# Patient Record
Sex: MALE | Race: WHITE | Employment: FULL TIME | URBAN - METROPOLITAN AREA
[De-identification: names, ages, dates, MRNs, and addresses within clinical notes are randomized per-mention and may not be internally consistent; named-entity substitution may affect disease eponyms.]

---

## 2021-03-02 ENCOUNTER — HOSPITAL ENCOUNTER (OUTPATIENT)
Dept: ULTRASOUND IMAGING | Age: 56
Discharge: HOME | End: 2021-03-02
Attending: FAMILY MEDICINE
Payer: COMMERCIAL

## 2021-03-02 DIAGNOSIS — R13.10 DYSPHAGIA, UNSPECIFIED TYPE: ICD-10-CM

## 2021-03-02 PROCEDURE — 76536 US EXAM OF HEAD AND NECK: CPT

## 2022-04-11 PROBLEM — Z85.46 HISTORY OF PROSTATE CANCER: Status: ACTIVE | Noted: 2022-04-11

## 2022-06-29 ENCOUNTER — OFFICE VISIT (OUTPATIENT)
Dept: ORTHOPEDIC SURGERY | Age: 57
End: 2022-06-29
Payer: COMMERCIAL

## 2022-06-29 DIAGNOSIS — M77.11 RIGHT LATERAL EPICONDYLITIS: Primary | ICD-10-CM

## 2022-06-29 PROCEDURE — 20551 NJX 1 TENDON ORIGIN/INSJ: CPT | Performed by: ORTHOPAEDIC SURGERY

## 2022-06-29 RX ORDER — MELOXICAM 15 MG/1
15 TABLET ORAL DAILY
Qty: 30 TABLET | Refills: 0 | Status: SHIPPED | OUTPATIENT
Start: 2022-06-29 | End: 2022-07-29

## 2022-06-29 RX ORDER — BETAMETHASONE SODIUM PHOSPHATE AND BETAMETHASONE ACETATE 3; 3 MG/ML; MG/ML
12 INJECTION, SUSPENSION INTRA-ARTICULAR; INTRALESIONAL; INTRAMUSCULAR; SOFT TISSUE ONCE
Status: SHIPPED | OUTPATIENT
Start: 2022-06-29

## 2022-06-29 NOTE — PROGRESS NOTES
Orthopaedic Hand Surgery Note    Name: Pacheco Moyer  YOB: 1965  Gender: male  MRN: [id number]    Follow up: Elbow Pain    HPI: Patient is a 59 y.o. male who return for evaluation of right lateral epicondylitis. On the last visit we performed a steroid injection and prescribed anti-inflammatories, patient reports injection provided nearly 100% relief of symptoms for about 6 weeks and then symptoms started to come back, at this point his symptoms have been going on for over 6 months. ROS/Meds/PSH/PMH/FH/SH: I personally reviewed the patients standard intake form. Pertinents are discussed in the HPI    Physical Examination:  General: Awake and alert. HEENT: Normocephalic, atraumatic  CV/Pulm: Breathing even and unlabored  Circulation: Normal without obvious arterial or venous deficiency. Pulses palpable bilateral upper extremities. Skin: No obvious rashes noted. Lymphatic: No obvious evidence of lymphedema or lymphadenopathy    Musculoskeletal:   Examination on the right upper extremity demonstrates normal sensation to light touch in the median, ulnar and radial distribution, cap refill < 5 seconds in all fingers. There is severe tenderness on the lateral epicondyle, no tenderness on the medial epicondyle or the olecranon, pain on the lateral elbow is aggravated by chairlift test and resisted wrist extension, there is no pain with palpation of the radial tunnel, negative Tinel along the radial nerve tract, no pain with resisted supination, no pain with resisted pronation. Imaging / Electrodiagnostic Tests:     none    Assessment:   1. Right lateral epicondylitis        Plan:  We discussed the diagnosis and different treatment options. We discussed observation, bracing, cortisone injections, therapy and lateral epicondyle debridement with tendon reattachment surgery.   We discussed that lateral epicondylitis is a chronic condition that has been progressing for much longer than the symptoms were evident, this is caused by degeneration of the tendon due to poor vascular supply that occurred over a long period of time, the patient understands that this condition will likely persist for a long time without medical treatment but that a large percentage of patients report improvement of symptoms with conservative measures including injections, braces, therapy and antiinflammatories. However, a small percentage of patients require surgical intervention at some point despite some short-term benefits of conservative treatment. After discussing the risks, benefits and alternatives of all treatment options in detail, the patient elects for right lateral epicondyle steroid injection, Mobic 15 daily for 6 weeks, home exercise program, I will reassess in 2 months, at that time if symptoms or not improving we will discuss surgical options again. Procedure Note    The risk, benefits and alternatives of injection and no injection therapy were discussed. The patient consented for an injection. The patient has been identified by name and birthdate. The injection site was identified, marked and prepped with alcohol swabs. Time out completed. The Right lateral epicondyle was injected with a 22 gauge needle with 2ml of 6mg/ml Betamethasone and 2ml Lidocaine plain 1%. After the area was numb, the 22 gauge needle was used to break down scar tissue and to create a bleeding bony bed to aid in tendon healing. The injection site was then dressed with a bandaid. The patient tolerated the injection well. The patient was instructed to monitor their blood sugars if diabetic and call if any concerns. The patient was instructed to call the office if any adverse local effects occurred or any if any questions or concerns arise. .     Patient voiced accordance and understanding of the information provided and the formulated plan. All questions were answered to the patient's satisfaction during the encounter.     [employer] Shalonda Kumar MD  Orthopaedic Surgery  06/29/22  4:16 PM

## 2022-08-31 ENCOUNTER — OFFICE VISIT (OUTPATIENT)
Dept: ORTHOPEDIC SURGERY | Age: 57
End: 2022-08-31
Payer: COMMERCIAL

## 2022-08-31 DIAGNOSIS — M77.11 LATERAL EPICONDYLITIS, RIGHT ELBOW: Primary | ICD-10-CM

## 2022-08-31 PROCEDURE — 99214 OFFICE O/P EST MOD 30 MIN: CPT | Performed by: ORTHOPAEDIC SURGERY

## 2022-08-31 RX ORDER — MELOXICAM 15 MG/1
15 TABLET ORAL DAILY
Qty: 42 TABLET | Refills: 0 | Status: SHIPPED | OUTPATIENT
Start: 2022-08-31 | End: 2022-10-12

## 2022-08-31 NOTE — PROGRESS NOTES
Orthopaedic Hand Surgery Note    Name: Glendy Fitzpatrick  YOB: 1965  Gender: male  MRN: [id number]    Follow up: Elbow Pain    HPI: Patient is a 59 y.o. male who return for evaluation of right lateral epicondylitis. On the last visit we performed a steroid injection and prescribed anti-inflammatories, patient reports 100% relief of symptoms for about 6 weeks and then symptoms started to recur, at this point he believes his symptoms are only slightly better than before he started to see me but he continues to have significant pain that interferes with his lifestyle as well as work due to pain on the lateral epicondyle. ROS/Meds/PSH/PMH/FH/SH: I personally reviewed the patients standard intake form. Pertinents are discussed in the HPI    Physical Examination:  General: Awake and alert. HEENT: Normocephalic, atraumatic  CV/Pulm: Breathing even and unlabored  Circulation: Normal without obvious arterial or venous deficiency. Pulses palpable bilateral upper extremities. Skin: No obvious rashes noted. Lymphatic: No obvious evidence of lymphedema or lymphadenopathy    Musculoskeletal:   Examination on the right upper extremity demonstrates normal sensation to light touch in the median, ulnar and radial distribution, cap refill < 5 seconds in all fingers. There is severe tenderness on the lateral epicondyle, no tenderness on the medial epicondyle or the olecranon, pain on the lateral elbow is aggravated by chairlift test and resisted wrist extension, there is no pain with palpation of the radial tunnel, negative Tinel along the radial nerve tract, no pain with resisted supination, no pain with resisted pronation. Imaging / Electrodiagnostic Tests:     right Elbow  XR: AP, Lateral, Oblique views     Clinical Indication:  1.  Lateral epicondylitis, right elbow           Report: AP, lateral, oblique elbow XR demonstrates well-maintained ulnohumeral and radiocapitellar space, olecranon enthesophyte consistent with prior history of triceps tendinitis, cortical irregularity on the lateral epicondyle suggestive of chronic lateral epicondylitis    Impression: as above     Celena Bain MD         Assessment:   1. Lateral epicondylitis, right elbow        Plan:  We discussed the diagnosis and different treatment options. We discussed observation, bracing, cortisone injections, therapy and lateral epicondyle debridement with tendon reattachment surgery. We discussed that lateral epicondylitis is a chronic condition that has been progressing for much longer than the symptoms were evident, this is caused by degeneration of the tendon due to poor vascular supply that occurred over a long period of time, the patient understands that this condition will likely persist for a long time without medical treatment but that a large percentage of patients report improvement of symptoms with conservative measures including injections, braces, therapy and antiinflammatories. However, a small percentage of patients require surgical intervention at some point despite some short-term benefits of conservative treatment. After discussing the risks, benefits and alternatives of all treatment options in detail, the patient elects for Mobic 15 daily for 6 weeks, home exercise program, we will reassess in 2 to 3 months, the patient is seriously considering having surgery but he needs to arrange things at work as well as figure out how to apply for short-term disability, if he decides to have surgery he will send us a message through [id number] so we can obtain an MRI before coming in for reassessment. Patient voiced accordance and understanding of the information provided and the formulated plan. All questions were answered to the patient's satisfaction during the encounter.     Celena Bain MD  Orthopaedic Surgery  08/31/22  3:43 PM

## 2022-09-12 ENCOUNTER — TELEPHONE (OUTPATIENT)
Dept: ORTHOPEDIC SURGERY | Age: 57
End: 2022-09-12

## 2022-09-12 DIAGNOSIS — M77.11 RIGHT LATERAL EPICONDYLITIS: Primary | ICD-10-CM

## 2022-09-21 ENCOUNTER — OFFICE VISIT (OUTPATIENT)
Dept: ORTHOPEDIC SURGERY | Age: 57
End: 2022-09-21
Payer: COMMERCIAL

## 2022-09-21 DIAGNOSIS — M77.11 RIGHT LATERAL EPICONDYLITIS: Primary | ICD-10-CM

## 2022-09-21 PROCEDURE — 99214 OFFICE O/P EST MOD 30 MIN: CPT | Performed by: ORTHOPAEDIC SURGERY

## 2022-09-21 NOTE — PROGRESS NOTES
Orthopaedic Hand Surgery Note    Name: Charlie Meyers  YOB: 1965  Gender: male  MRN: [id number]    Follow up: Elbow Pain    HPI: Patient is a 59 y.o. male who return for evaluation of right lateral epicondylitis. 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 ROS/Meds/PSH/PMH/FH/SH: I personally reviewed the patients standard intake form. Pertinents are discussed in the HPI    Physical Examination:  General: Awake and alert. HEENT: Normocephalic, atraumatic  CV/Pulm: Breathing even and unlabored  Circulation: Normal without obvious arterial or venous deficiency. Pulses palpable bilateral upper extremities. Skin: No obvious rashes noted. Lymphatic: No obvious evidence of lymphedema or lymphadenopathy    Musculoskeletal:   Examination on the right upper extremity demonstrates normal sensation to light touch in the median, ulnar and radial distribution, cap refill < 5 seconds in all fingers. There is severe tenderness on the lateral epicondyle, no tenderness on the medial epicondyle or the olecranon, pain on the lateral elbow is aggravated by chairlift test and resisted wrist extension, there is no pain with palpation of the radial tunnel, negative Tinel along the radial nerve tract, no pain with resisted supination, no pain with resisted pronation. Imaging / Electrodiagnostic Tests:     MRI of the right elbow was reviewed with the patient which demonstrates partial-thickness tear of the common extensor mass from the lateral epicondyle consistent with chronic lateral epicondylitis    Assessment:   1.  Right lateral epicondylitis        Plan:  We discussed the diagnosis and different treatment options. We discussed observation, bracing, cortisone injections, therapy and lateral epicondyle debridement with tendon reattachment surgery. We discussed that lateral epicondylitis is a chronic condition that has been progressing for much longer than the symptoms were evident, this is caused by degeneration of the tendon due to poor vascular supply that occurred over a long period of time, the patient understands that this condition will likely persist for a long time without medical treatment but that a large percentage of patients report improvement of symptoms with conservative measures including injections, braces, therapy and antiinflammatories. However, a small percentage of patients require surgical intervention at some point despite some short-term benefits of conservative treatment. After discussing the risks, benefits and alternatives of all treatment options in detail, the patient elects for right lateral epicondyle debridement and tendon reattachment. Patient understands risks and benefits of RIGHT LATERAL EPICONDYLE DEBRIDEMENT AND TENDON REATTACHMENT including but not limited to nerve injury, vessel injury, infection, failure to achieve desired results and possible need for additional surgery. Patient understands and wishes to proceed with surgery. On Exam:   The patient is alert and oriented; ;   Lung auscultation is clear bilaterally   Heart has RRR without murmurs  . Patient voiced accordance and understanding of the information provided and the formulated plan. All questions were answered to the patient's satisfaction during the encounter.     Rao Hawkins MD  Orthopaedic Surgery  09/21/22  3:51 PM

## 2022-09-27 ENCOUNTER — TELEPHONE (OUTPATIENT)
Dept: ORTHOPEDIC SURGERY | Age: 57
End: 2022-09-27

## 2023-01-25 ENCOUNTER — OFFICE VISIT (OUTPATIENT)
Dept: FAMILY MEDICINE CLINIC | Facility: CLINIC | Age: 58
End: 2023-01-25
Payer: COMMERCIAL

## 2023-01-25 VITALS
BODY MASS INDEX: 31.42 KG/M2 | HEIGHT: 76 IN | SYSTOLIC BLOOD PRESSURE: 120 MMHG | DIASTOLIC BLOOD PRESSURE: 70 MMHG | WEIGHT: 258 LBS

## 2023-01-25 DIAGNOSIS — E16.2 HYPOGLYCEMIA: Primary | ICD-10-CM

## 2023-01-25 DIAGNOSIS — Z85.46 HISTORY OF PROSTATE CANCER: ICD-10-CM

## 2023-01-25 PROCEDURE — 99213 OFFICE O/P EST LOW 20 MIN: CPT | Performed by: FAMILY MEDICINE

## 2023-01-25 ASSESSMENT — PATIENT HEALTH QUESTIONNAIRE - PHQ9
SUM OF ALL RESPONSES TO PHQ QUESTIONS 1-9: 0
SUM OF ALL RESPONSES TO PHQ9 QUESTIONS 1 & 2: 0
2. FEELING DOWN, DEPRESSED OR HOPELESS: 0
1. LITTLE INTEREST OR PLEASURE IN DOING THINGS: 0
SUM OF ALL RESPONSES TO PHQ QUESTIONS 1-9: 0

## 2023-01-25 NOTE — PROGRESS NOTES
SUBJECTIVE:   Omega Prater is a 59 y.o. male who has a past medical history significant for hypertension, high triglycerides, prostate cancer, globus pharyngeus, dysphonia and subclinical hypothyroidism. Patient has not been seen in over a year. Overall he is doing very well. However he reports that he recently had a life insurance physical with blood work. He states that his blood work showed a blood sugar of 18. Patient has no history of hypoglycemia. He reports no symptoms. He is concerned about this value and what the implications potentially could be. He is obviously not on insulin nor any other hypoglycemic medication. HPI  See above    Past Medical History, Past Surgical History, Family history, Social History, and Medications were all reviewed with the patient today and updated as necessary.        Current Outpatient Medications   Medication Sig Dispense Refill    meloxicam (MOBIC) 15 MG tablet Take 1 tablet by mouth daily 42 tablet 0    meloxicam (MOBIC) 15 MG tablet Take 1 tablet by mouth daily 30 tablet 0     Current Facility-Administered Medications   Medication Dose Route Frequency Provider Last Rate Last Admin    betamethasone acetate-betamethasone sodium phosphate (CELESTONE) injection 12 mg  12 mg Intra-artICUlar Once Joi Berg MD         Allergies   Allergen Reactions    Bee Venom Swelling     Patient Active Problem List   Diagnosis    History of prostate cancer    Lateral epicondylitis, right elbow     Past Medical History:   Diagnosis Date    Cancer [facility])     prostate 2013 - resolved     Chronic pain     right knee      Past Surgical History:   Procedure Laterality Date    COLONOSCOPY      GI      hemmhoroid    HERNIA REPAIR      KNEE ARTHROSCOPY Right 07/2020    ORTHOPEDIC SURGERY      back x2  low    PROSTATECTOMY      UROLOGICAL SURGERY       Family History   Problem Relation Age of Onset    No Known Problems Brother     No Known Problems Sister     No Known Problems Brother No Known Problems Mother     Psychiatric Disorder Father     Dementia Father     No Known Problems Sister      Social History     Tobacco Use    Smoking status: Never    Smokeless tobacco: Never   Substance Use Topics    Alcohol use: No         Review of Systems  See above    OBJECTIVE:  /70   Ht 6' 4\" (1.93 m)   Wt 258 lb (117 kg)   BMI 31.40 kg/m²      Physical Exam  Constitutional:       General: He is not in acute distress. Appearance: Normal appearance. He is not ill-appearing. HENT:      Head: Normocephalic and atraumatic. Cardiovascular:      Rate and Rhythm: Normal rate and regular rhythm. Heart sounds: Normal heart sounds. No murmur heard. Pulmonary:      Effort: Pulmonary effort is normal.      Breath sounds: Normal breath sounds. No wheezing or rhonchi. Musculoskeletal:         General: Normal range of motion. Cervical back: Normal range of motion and neck supple. Right lower leg: No edema. Left lower leg: No edema. Skin:     General: Skin is warm. Findings: No rash. Neurological:      Mental Status: He is alert and oriented to person, place, and time. Psychiatric:         Mood and Affect: Mood normal.         Behavior: Behavior normal.         Thought Content: Thought content normal.         Judgment: Judgment normal.       Medical problems and test results were reviewed with the patient today. ASSESSMENT and PLAN    1. Hypoglycemia. Last 2 blood sugars I have measured in the office are 94 and 97. Random blood sugar today is. I believe that this is a lab error and not an accurate reading. 2.  History of prostate cancer. Continue follow-up with urology. Elements of this note have been dictated using speech recognition software. As a result, errors of speech recognition may have occurred.

## 2023-02-07 ENCOUNTER — NURSE ONLY (OUTPATIENT)
Dept: FAMILY MEDICINE CLINIC | Facility: CLINIC | Age: 58
End: 2023-02-07
Payer: COMMERCIAL

## 2023-02-07 DIAGNOSIS — Z12.5 SPECIAL SCREENING FOR MALIGNANT NEOPLASM OF PROSTATE: ICD-10-CM

## 2023-02-07 DIAGNOSIS — Z00.00 LABORATORY EXAMINATION ORDERED AS PART OF A ROUTINE GENERAL MEDICAL EXAMINATION: Primary | ICD-10-CM

## 2023-02-07 DIAGNOSIS — Z85.46 HISTORY OF PROSTATE CANCER: ICD-10-CM

## 2023-02-07 DIAGNOSIS — I10 ESSENTIAL (PRIMARY) HYPERTENSION: ICD-10-CM

## 2023-02-07 DIAGNOSIS — E03.9 ACQUIRED HYPOTHYROIDISM: ICD-10-CM

## 2023-02-07 DIAGNOSIS — E78.00 PURE HYPERCHOLESTEROLEMIA, UNSPECIFIED: ICD-10-CM

## 2023-02-07 LAB
ALBUMIN SERPL-MCNC: 4 G/DL (ref 3.5–5)
ALBUMIN/GLOB SERPL: 1.5 (ref 0.4–1.6)
ALP SERPL-CCNC: 89 U/L (ref 50–136)
ALT SERPL-CCNC: 25 U/L (ref 12–65)
ANION GAP SERPL CALC-SCNC: 2 MMOL/L (ref 2–11)
AST SERPL-CCNC: 15 U/L (ref 15–37)
BILIRUB SERPL-MCNC: 0.8 MG/DL (ref 0.2–1.1)
BUN SERPL-MCNC: 15 MG/DL (ref 6–23)
CALCIUM SERPL-MCNC: 9.3 MG/DL (ref 8.3–10.4)
CHLORIDE SERPL-SCNC: 111 MMOL/L (ref 101–110)
CHOLEST SERPL-MCNC: 224 MG/DL
CO2 SERPL-SCNC: 24 MMOL/L (ref 21–32)
CREAT SERPL-MCNC: 0.9 MG/DL (ref 0.8–1.5)
GLOBULIN SER CALC-MCNC: 2.7 G/DL (ref 2.8–4.5)
GLUCOSE SERPL-MCNC: 110 MG/DL (ref 65–100)
GRANS ABSOLUTE, POC: 8.8 K/UL
GRANULOCYTES %, POC: 83 %
HDLC SERPL-MCNC: 46 MG/DL (ref 40–60)
HDLC SERPL: 4.9
HEMATOCRIT, POC: 50.4 %
HEMOGLOBIN, POC: 16.3 G/DL
LDLC SERPL CALC-MCNC: 150 MG/DL
LYMPHOCYTE %, POC: 12.4 %
LYMPHS ABSOLUTE, POC: 1.3 K/UL
MCH, POC: 28.2 PG (ref 20–?)
MCHC, POC: 32.3
MCV, POC: 87.1
MONOCYTE %, POC: 4.6 %
MONOCYTE, ABSOLUTE POC: 0.5 K/UL
MPV, POC: 7.8 FL
PLATELET COUNT, POC: 235 K/UL
POTASSIUM SERPL-SCNC: 4.3 MMOL/L (ref 3.5–5.1)
PROT SERPL-MCNC: 6.7 G/DL (ref 6.3–8.2)
PSA SERPL-MCNC: 4 NG/ML
RBC, POC: 5.79 M/UL
RDW, POC: NORMAL %
SODIUM SERPL-SCNC: 137 MMOL/L (ref 133–143)
TRIGL SERPL-MCNC: 140 MG/DL (ref 35–150)
TSH, 3RD GENERATION: 1.42 UIU/ML (ref 0.36–3.74)
VLDLC SERPL CALC-MCNC: 28 MG/DL (ref 6–23)
WBC, POC: 10.6 K/UL

## 2023-02-07 PROCEDURE — 85025 COMPLETE CBC W/AUTO DIFF WBC: CPT | Performed by: FAMILY MEDICINE

## 2023-02-21 ENCOUNTER — OFFICE VISIT (OUTPATIENT)
Dept: FAMILY MEDICINE CLINIC | Facility: CLINIC | Age: 58
End: 2023-02-21
Payer: COMMERCIAL

## 2023-02-21 VITALS
HEIGHT: 76 IN | SYSTOLIC BLOOD PRESSURE: 114 MMHG | OXYGEN SATURATION: 96 % | WEIGHT: 261.2 LBS | BODY MASS INDEX: 31.81 KG/M2 | HEART RATE: 72 BPM | DIASTOLIC BLOOD PRESSURE: 76 MMHG

## 2023-02-21 DIAGNOSIS — I10 PRIMARY HYPERTENSION: ICD-10-CM

## 2023-02-21 DIAGNOSIS — E78.00 PURE HYPERCHOLESTEROLEMIA: ICD-10-CM

## 2023-02-21 DIAGNOSIS — Z85.46 HISTORY OF PROSTATE CANCER: ICD-10-CM

## 2023-02-21 DIAGNOSIS — E78.1 PURE HYPERGLYCERIDEMIA: ICD-10-CM

## 2023-02-21 DIAGNOSIS — Z00.00 ROUTINE GENERAL MEDICAL EXAMINATION AT A HEALTH CARE FACILITY: Primary | ICD-10-CM

## 2023-02-21 PROCEDURE — 99396 PREV VISIT EST AGE 40-64: CPT | Performed by: FAMILY MEDICINE

## 2023-02-21 PROCEDURE — 3074F SYST BP LT 130 MM HG: CPT | Performed by: FAMILY MEDICINE

## 2023-02-21 PROCEDURE — 3078F DIAST BP <80 MM HG: CPT | Performed by: FAMILY MEDICINE

## 2023-02-21 ASSESSMENT — PATIENT HEALTH QUESTIONNAIRE - PHQ9
2. FEELING DOWN, DEPRESSED OR HOPELESS: 0
SUM OF ALL RESPONSES TO PHQ QUESTIONS 1-9: 0
1. LITTLE INTEREST OR PLEASURE IN DOING THINGS: 0
SUM OF ALL RESPONSES TO PHQ QUESTIONS 1-9: 0
SUM OF ALL RESPONSES TO PHQ9 QUESTIONS 1 & 2: 0
SUM OF ALL RESPONSES TO PHQ QUESTIONS 1-9: 0
SUM OF ALL RESPONSES TO PHQ QUESTIONS 1-9: 0

## 2023-02-21 ASSESSMENT — ANXIETY QUESTIONNAIRES
5. BEING SO RESTLESS THAT IT IS HARD TO SIT STILL: 0
7. FEELING AFRAID AS IF SOMETHING AWFUL MIGHT HAPPEN: 0
1. FEELING NERVOUS, ANXIOUS, OR ON EDGE: 0
4. TROUBLE RELAXING: 0
3. WORRYING TOO MUCH ABOUT DIFFERENT THINGS: 0
6. BECOMING EASILY ANNOYED OR IRRITABLE: 0
GAD7 TOTAL SCORE: 0
2. NOT BEING ABLE TO STOP OR CONTROL WORRYING: 0

## 2023-02-21 NOTE — PROGRESS NOTES
SUBJECTIVE:   Jolanta Mckeon is a 59 y.o. male here for CPX. Patient has a past medical history significant for high cholesterol, high triglycerides, hypertension and prostate cancer. Current medicines are listed in the EMR and reviewed today. Review of systems reveals no complaints of chest pain, shortness of breath, orthopnea or PND. GI and  review of systems is unremarkable. HPI  See above    Past Medical History, Past Surgical History, Family history, Social History, and Medications were all reviewed with the patient today and updated as necessary.        Current Outpatient Medications   Medication Sig Dispense Refill    meloxicam (MOBIC) 15 MG tablet Take 1 tablet by mouth daily 30 tablet 0     Current Facility-Administered Medications   Medication Dose Route Frequency Provider Last Rate Last Admin    betamethasone acetate-betamethasone sodium phosphate (CELESTONE) injection 12 mg  12 mg Intra-artICUlar Once Naheed Ko MD         Allergies   Allergen Reactions    Bee Venom Swelling     Patient Active Problem List   Diagnosis    History of prostate cancer    Lateral epicondylitis, right elbow     Past Medical History:   Diagnosis Date    Cancer [facility])     prostate 2013 - resolved     Chronic pain     right knee      Past Surgical History:   Procedure Laterality Date    COLONOSCOPY      GI      hemmhoroid    HERNIA REPAIR      KNEE ARTHROSCOPY Right 07/2020    ORTHOPEDIC SURGERY      back x2  low    PROSTATECTOMY      UROLOGICAL SURGERY       Family History   Problem Relation Age of Onset    No Known Problems Brother     No Known Problems Sister     No Known Problems Brother     No Known Problems Mother     Psychiatric Disorder Father     Dementia Father     No Known Problems Sister      Social History     Tobacco Use    Smoking status: Never    Smokeless tobacco: Never   Substance Use Topics    Alcohol use: No         Review of Systems  See above    OBJECTIVE:  /76   Pulse 72   Ht 6' 4\" (1.93 m)   Wt 261 lb 3.2 oz (118.5 kg)   SpO2 96%   BMI 31.79 kg/m²      Physical Exam  Vitals reviewed. Constitutional:       General: He is not in acute distress. Appearance: Normal appearance. HENT:      Head: Normocephalic and atraumatic. Right Ear: Tympanic membrane, ear canal and external ear normal. There is no impacted cerumen. Left Ear: Tympanic membrane, ear canal and external ear normal. There is no impacted cerumen. Nose: Nose normal.      Mouth/Throat:      Mouth: Mucous membranes are moist.      Pharynx: Oropharynx is clear. No oropharyngeal exudate or posterior oropharyngeal erythema. Eyes:      General: No scleral icterus. Extraocular Movements: Extraocular movements intact. Conjunctiva/sclera: Conjunctivae normal.      Pupils: Pupils are equal, round, and reactive to light. Neck:      Vascular: No carotid bruit. Cardiovascular:      Rate and Rhythm: Normal rate and regular rhythm. Pulses: Normal pulses. Heart sounds: Normal heart sounds. No murmur heard. Pulmonary:      Effort: Pulmonary effort is normal. No respiratory distress. Breath sounds: Normal breath sounds. No wheezing or rhonchi. Abdominal:      General: Abdomen is flat. Bowel sounds are normal. There is no distension. Palpations: Abdomen is soft. There is no mass. Tenderness: There is no abdominal tenderness. There is no guarding or rebound. Hernia: No hernia is present. Musculoskeletal:         General: Normal range of motion. Cervical back: Normal range of motion and neck supple. Right lower leg: No edema. Left lower leg: No edema. Lymphadenopathy:      Cervical: No cervical adenopathy. Skin:     General: Skin is warm. Findings: No rash. Neurological:      General: No focal deficit present. Mental Status: He is alert and oriented to person, place, and time. Cranial Nerves: No cranial nerve deficit. Motor: No weakness.       Deep Tendon Reflexes: Reflexes normal.   Psychiatric:         Mood and Affect: Mood normal.         Behavior: Behavior normal.         Thought Content: Thought content normal.         Judgment: Judgment normal.       Medical problems and test results were reviewed with the patient today. ASSESSMENT and PLAN    1.  CPX. Anticipatory guidance discussed including the importance of sunscreen use, helmet use and seatbelt use. Screening colonoscopy is up-to-date. 2.  High cholesterol. LDL is 150. Previously 70. Dietary focus. Recheck in 6 months. 3.  High triglycerides. Triglycerides are 140. Previously 435. Continue dietary focus. 4.  Hypertension. /76. Renal function and electrolytes are normal.  Continue current therapy. 5.  History of prostate cancer. Patient states that he has been followed every 3 months by urology. PSA was 4.0. Patient states he has an upcoming appointment in the future which she will discuss with the neurologist his current lab value. He states that his last PSA was 2.9. Elements of this note have been dictated using speech recognition software. As a result, errors of speech recognition may have occurred.

## 2023-08-25 ENCOUNTER — NURSE ONLY (OUTPATIENT)
Dept: FAMILY MEDICINE CLINIC | Facility: CLINIC | Age: 58
End: 2023-08-25
Payer: COMMERCIAL

## 2023-08-25 DIAGNOSIS — E78.1 PURE HYPERGLYCERIDEMIA: ICD-10-CM

## 2023-08-25 DIAGNOSIS — E16.2 HYPOGLYCEMIA: ICD-10-CM

## 2023-08-25 DIAGNOSIS — E78.00 PURE HYPERCHOLESTEROLEMIA: ICD-10-CM

## 2023-08-25 LAB
CHOLEST SERPL-MCNC: 207 MG/DL
GLUCOSE SERPL-MCNC: 102 MG/DL (ref 65–100)
HDLC SERPL-MCNC: 53 MG/DL (ref 40–60)
HDLC SERPL: 3.9
LDLC SERPL CALC-MCNC: 118.8 MG/DL
TRIGL SERPL-MCNC: 176 MG/DL (ref 35–150)
VLDLC SERPL CALC-MCNC: 35.2 MG/DL (ref 6–23)

## 2023-08-25 PROCEDURE — 36415 COLL VENOUS BLD VENIPUNCTURE: CPT | Performed by: FAMILY MEDICINE

## 2023-08-31 ENCOUNTER — OFFICE VISIT (OUTPATIENT)
Dept: FAMILY MEDICINE CLINIC | Facility: CLINIC | Age: 58
End: 2023-08-31
Payer: COMMERCIAL

## 2023-08-31 VITALS
DIASTOLIC BLOOD PRESSURE: 78 MMHG | WEIGHT: 254.8 LBS | HEIGHT: 76 IN | SYSTOLIC BLOOD PRESSURE: 118 MMHG | BODY MASS INDEX: 31.03 KG/M2

## 2023-08-31 DIAGNOSIS — E78.00 PURE HYPERCHOLESTEROLEMIA: Primary | ICD-10-CM

## 2023-08-31 DIAGNOSIS — E78.1 PURE HYPERGLYCERIDEMIA: ICD-10-CM

## 2023-08-31 DIAGNOSIS — I10 PRIMARY HYPERTENSION: ICD-10-CM

## 2023-08-31 PROCEDURE — 3074F SYST BP LT 130 MM HG: CPT | Performed by: FAMILY MEDICINE

## 2023-08-31 PROCEDURE — 3078F DIAST BP <80 MM HG: CPT | Performed by: FAMILY MEDICINE

## 2023-08-31 PROCEDURE — 99213 OFFICE O/P EST LOW 20 MIN: CPT | Performed by: FAMILY MEDICINE

## 2023-08-31 ASSESSMENT — PATIENT HEALTH QUESTIONNAIRE - PHQ9
SUM OF ALL RESPONSES TO PHQ QUESTIONS 1-9: 0
SUM OF ALL RESPONSES TO PHQ QUESTIONS 1-9: 0
SUM OF ALL RESPONSES TO PHQ9 QUESTIONS 1 & 2: 0
2. FEELING DOWN, DEPRESSED OR HOPELESS: 0
SUM OF ALL RESPONSES TO PHQ QUESTIONS 1-9: 0
SUM OF ALL RESPONSES TO PHQ QUESTIONS 1-9: 0
1. LITTLE INTEREST OR PLEASURE IN DOING THINGS: 0

## 2023-11-20 ENCOUNTER — TELEPHONE (OUTPATIENT)
Dept: FAMILY MEDICINE CLINIC | Facility: CLINIC | Age: 58
End: 2023-11-20

## 2023-11-20 NOTE — TELEPHONE ENCOUNTER
Pt was called.  He stated that he was seen in the urgent care over the weekend whom referred him back to his urologist.   Pt was seen by the Urologist this am.

## 2023-11-20 NOTE — TELEPHONE ENCOUNTER
Late entry: 11/19/2023 at 10:15 AM    Patient called provider on call with complaints of sudden onset of hematuria. Patient reports that he went to the bathroom about 2 hours prior and had no symptoms. Unfortunately, prior to the call, he reports urinating and noted a significant amount of gross hematuria with urination. He reports no pain or discomfort, no urgency or frequency, no flank tenderness, no fever or chills and no other symptoms aside for gross hematuria. He contacted on-call provider for further instructions. Etiology was unclear but can include not limited to malignancy, prostatitis, nephrolithiasis, cystitis, etc. Patient was advised to increase his water intake of at least 2 L or more per day in case of cystitis or nephrolithiasis. Patient was advised to be seen at urgent care for evaluation and treatment.

## 2024-04-10 ENCOUNTER — HOSPITAL ENCOUNTER (OUTPATIENT)
Dept: PHYSICAL THERAPY | Age: 59
Setting detail: RECURRING SERIES
Discharge: HOME | End: 2024-04-13
Payer: COMMERCIAL

## 2024-04-10 DIAGNOSIS — M25.461 EFFUSION, RIGHT KNEE: ICD-10-CM

## 2024-04-10 DIAGNOSIS — M25.661 STIFFNESS OF RIGHT KNEE, NOT ELSEWHERE CLASSIFIED: ICD-10-CM

## 2024-04-10 DIAGNOSIS — M25.561 PAIN IN JOINT OF RIGHT KNEE: Primary | ICD-10-CM

## 2024-04-10 PROCEDURE — 97110 THERAPEUTIC EXERCISES: CPT

## 2024-04-10 PROCEDURE — 97161 PT EVAL LOW COMPLEX 20 MIN: CPT

## 2024-04-10 ASSESSMENT — PAIN SCALES - GENERAL: PAINLEVEL_OUTOF10: 4

## 2024-04-10 NOTE — PROGRESS NOTES
David Marie  : 1965  Primary: [employer]  Secondary:  [employer]  [address]  [address]  [place] [address]  Phone: [phone]  Fax: [phone] Plan Frequency: 1-2x/wk for 4-6 weeks  Plan of Care/Certification Expiration Date: 24        Plan of Care/Certification Expiration Date:  Plan of Care/Certification Expiration Date: 24    Frequency/Duration: Plan Frequency: 1-2x/wk for 4-6 weeks      Time In/Out:   Time In: 815  Time Out: 910      PT Visit Info:    Total # of Visits to Date: 1      Visit Count:  1    OUTPATIENT PHYSICAL THERAPY:   Treatment Note 4/10/2024       Episode  (R knee pain)               Treatment Diagnosis:    Pain in joint of right knee  Stiffness of right knee, not elsewhere classified  Effusion, right knee  Medical/Referring Diagnosis:    Unilateral primary osteoarthritis, right knee [M17.11]    Referring Physician:  Rosey Shanks MD MD Orders:  PT Eval and Treat, 1-2x/wk for 4-6 weeks  Return MD Appt:  24   Date of Onset:  Onset Date: 24     Allergies:   Bee venom  Restrictions/Precautions:   None      Interventions Planned (Treatment may consist of any combination of the following):     See Assessment Note    Subjective Comments:   Pt reports he had his foot stuck under an ottoman and turned and felt his knee pop. Also reports it was aggravated with step ups while in PT at [facility] for his shoulder which is a worker's comp injury that he is still being seen for at this time but is awaiting a new MRI of the shoulder due to lingering issues.    Initial Pain Level::     4/10  Post Session Pain Level:       4/10  Medications Last Reviewed:  4/10/2024  Updated Objective Findings:  See Evaluation Note from today  Treatment   THERAPEUTIC EXERCISE: (25 minutes):    Exercises per grid below to improve mobility, strength, balance, and coordination.  Required minimal verbal and tactile cues to promote proper body alignment, promote

## 2024-04-15 ENCOUNTER — HOSPITAL ENCOUNTER (OUTPATIENT)
Dept: PHYSICAL THERAPY | Age: 59
Setting detail: RECURRING SERIES
Discharge: HOME | End: 2024-04-18
Payer: COMMERCIAL

## 2024-04-15 PROCEDURE — 97110 THERAPEUTIC EXERCISES: CPT

## 2024-04-15 ASSESSMENT — PAIN SCALES - GENERAL: PAINLEVEL_OUTOF10: 0

## 2024-04-15 NOTE — PROGRESS NOTES
mobilization and Soft tissue mobilization was utilized and necessary because of the patient's restricted joint motion, painful spasm, loss of articular motion, and restricted motion of soft tissue.   MODALITIES:             Date:  4/10/24 Date:  4/15/24 Date:     Activity/Exercise Parameters Parameters Parameters   Bike  5 mins, 4.0    Quad set 10x      SAQ 10x w/ ball between ankles 20x w/ ball between ankles and 2#     SLR 10x B 2x10 B 2#     Hip abduction 10x B sidelying Standing on foam in // orange band 10x B     Clamshell 10x B BTB     Hip extension 10x B prone Standing on foam in // orange band 10x B    Hamstring curl 10x B standing BTB     Banded walk 10ft x6 sideways  10ftx4 forward     Hamstring curl 10x B BTB standing     Hamstring stretch 2x B seated w/ belt     KT tape For stability/tracking For stability/tracking    Leg press  20x 100# BL  20x 75# SL    Heel tap   10x B 6 inch step    3 way balance  Forward, side, back 1 balance pole 5x each    TKE  20x purple sport band        Treatment/Session Summary:    Treatment Assessment:   Pt had no pain increase w/ exercises. Added new weight bearing exercises for LE strengthening and stability. Continue progressing as he tolerates.   Communication/Consultation:  None today  Equipment provided today:  HEP  Recommendations/Intent for next treatment session: Next visit will focus on progression of functional tasks as tolerated.    >Total Treatment Billable Duration:  40 minutes therex  Time In: 0730  Time Out: 0820    Josephine Rosas PT         Charge Capture  [employer]  Appt Desk     Future Appointments   Date Time Provider Department Center   4/15/2024  3:30 PM [facility]   4/17/2024  7:30 AM Josephine Rosas PT [facility]   4/24/2024  7:30 AM Josephine Rosas PT [facility]   5/1/2024  7:30 AM Josephine Rosas PT [facility]   5/6/2024  7:30 AM Josephine Rosas PT [facility]   5/8/2024  7:30 AM Josephine Rosas, PT

## 2024-04-18 ENCOUNTER — HOSPITAL ENCOUNTER (OUTPATIENT)
Dept: PHYSICAL THERAPY | Age: 59
Setting detail: RECURRING SERIES
Discharge: HOME | End: 2024-04-21
Payer: COMMERCIAL

## 2024-04-18 PROCEDURE — 97110 THERAPEUTIC EXERCISES: CPT

## 2024-04-18 ASSESSMENT — PAIN SCALES - GENERAL: PAINLEVEL_OUTOF10: 0

## 2024-04-18 NOTE — PROGRESS NOTES
mobilization was utilized and necessary because of the patient's restricted joint motion, painful spasm, loss of articular motion, and restricted motion of soft tissue.   MODALITIES:             Date:  4/10/24 Date:  4/15/24 Date:  4/18/24   Activity/Exercise Parameters Parameters Parameters   Bike  5 mins, 4.0 5 mins, 4.0    Quad set 10x   20x R   SAQ 10x w/ ball between ankles 20x w/ ball between ankles and 2#  20x 4# R on half foam   SLR 10x B 2x10 B 2#  2x10 R 4#    Hip abduction 10x B sidelying Standing on foam in // orange band 10x B     Clamshell 10x B BTB  20x B BTB   Bridge   20x BTB   Hip extension 10x B prone Standing on foam in // orange band 10x B    Hamstring curl 10x B standing BTB  20x black band   LAQ   20x 4#    Banded walk 10ft x6 sideways  10ftx4 forward     Hamstring stretch 2x B seated w/ belt     KT tape For stability/tracking For stability/tracking    Leg press  20x 100# BL  20x 75# SL 20x 125# BL  20x 75# SL   Heel tap   10x B 6 inch step    3 way balance  Forward, side, back 1 balance pole 5x each    TKE  20x purple sport band    Lunge   On wedge 10x B   Squats   On wedge 10x       Treatment/Session Summary:    Treatment Assessment:   Performed less standing therex today to see if that affected his buckling. Increased challenge of seated and standing therex as well as resistance on leg press machine. No pain post session.   Communication/Consultation:  None today  Equipment provided today:  HEP  Recommendations/Intent for next treatment session: Next visit will focus on progression of functional tasks as tolerated.    >Total Treatment Billable Duration:  40 minutes therex  Time In: 0730  Time Out: 0816    Josephine Rosas PT         Charge Capture  [employer] Portal  Appt Desk     Future Appointments   Date Time Provider Department Center   4/24/2024  7:30 AM Josephine Rosas PT [facility]   5/1/2024  7:30 AM Josephine Rosas PT [facility]   5/6/2024  7:30 AM Josephine Rosas, DIANA

## 2024-04-24 ENCOUNTER — HOSPITAL ENCOUNTER (OUTPATIENT)
Dept: PHYSICAL THERAPY | Age: 59
Setting detail: RECURRING SERIES
Discharge: HOME | End: 2024-04-27
Payer: COMMERCIAL

## 2024-04-24 PROCEDURE — 97110 THERAPEUTIC EXERCISES: CPT

## 2024-04-24 ASSESSMENT — PAIN SCALES - GENERAL: PAINLEVEL_OUTOF10: 0

## 2024-04-24 NOTE — PROGRESS NOTES
David Marie  : 1965  Primary: [facility] ([facility])  Secondary:  [facility]  [address]  [address]  [place] [address]  Phone: [phone]  Fax: [phone] Plan Frequency: 1-2x/wk for 4-6 weeks  Plan of Care/Certification Expiration Date: 24        Plan of Care/Certification Expiration Date:  Plan of Care/Certification Expiration Date: 24    Frequency/Duration: Plan Frequency: 1-2x/wk for 4-6 weeks      Time In/Out:   Time In: 730  Time Out: 817      PT Visit Info:    Total # of Visits to Date: 4      Visit Count:  4    OUTPATIENT PHYSICAL THERAPY:   Treatment Note 2024       Episode  (R knee pain)               Treatment Diagnosis:    Pain in joint of right knee  Stiffness of right knee, not elsewhere classified  Effusion, right knee  Medical/Referring Diagnosis:    Unilateral primary osteoarthritis, right knee [M17.11]    Referring Physician:  Rosey Shanks MD MD Orders:  PT Eval and Treat, 1-2x/wk for 4-6 weeks  Return MD Appt:  24   Date of Onset:  Onset Date: 24     Allergies:   Bee venom  Restrictions/Precautions:   None      Interventions Planned (Treatment may consist of any combination of the following):     See Assessment Note    Subjective Comments:   Pt reports he also got an MRI on his knee but waiting on results. Is going to get surgery on his shoulder again.   Initial Pain Level::     0/10  Post Session Pain Level:       0/10  Medications Last Reviewed:  2024  Updated Objective Findings:  None Today  Treatment   THERAPEUTIC EXERCISE: (40 minutes):    Exercises per grid below to improve mobility, strength, balance, and coordination.  Required minimal verbal and tactile cues to promote proper body alignment, promote proper body posture, and promote proper body mechanics.  Progressed resistance, range, repetitions, and complexity of movement as indicated.  MANUAL THERAPY: (0 minutes):   Joint mobilization and Soft tissue

## 2024-05-01 ENCOUNTER — HOSPITAL ENCOUNTER (OUTPATIENT)
Dept: PHYSICAL THERAPY | Age: 59
Setting detail: RECURRING SERIES
End: 2024-05-01
Payer: COMMERCIAL

## 2024-05-01 NOTE — PROGRESS NOTES
David Marie  : 1965  Primary: [facility]  Secondary:  [facility]  [address]  [address]  [facility] [address]  Phone: [phone]  Fax: [phone]    PT Visit Info:    Total # of Visits to Date: 4  Canceled Appointment: 1     OT Visit Info:  No data recorded    OUTPATIENT THERAPY: 2024  Episode  Appt Desk        David Marie cancelled his appointment for today due to  being advised to hold off on PT until after he sees orthopedics .  Will plan to follow up next during next appointment.  Thank you,  Josephine Rosas, PT    Future Appointments   Date Time Provider Department Center   2024  7:00 PM Josephine Rosas, PT [facility]   5/15/2024  7:30 AM Josephine Rosas, PT [facility]   5/15/2024 11:15 AM PST LAB PST [facility] AMB   2024  7:30 AM Josephine Rosas, PT [facility]   2024  7:30 AM Josephine Rosas, PT [facility]   2024  2:40 PM David Berrios MD PST [facility] AMB

## 2024-05-06 ENCOUNTER — APPOINTMENT (OUTPATIENT)
Dept: PHYSICAL THERAPY | Age: 59
End: 2024-05-06
Payer: COMMERCIAL

## 2024-05-08 ENCOUNTER — APPOINTMENT (OUTPATIENT)
Dept: PHYSICAL THERAPY | Age: 59
End: 2024-05-08
Payer: COMMERCIAL

## 2024-05-13 ENCOUNTER — APPOINTMENT (OUTPATIENT)
Dept: PHYSICAL THERAPY | Age: 59
End: 2024-05-13
Payer: COMMERCIAL

## 2024-05-15 ENCOUNTER — HOSPITAL ENCOUNTER (OUTPATIENT)
Dept: PHYSICAL THERAPY | Age: 59
Setting detail: RECURRING SERIES
End: 2024-05-15
Payer: COMMERCIAL

## 2024-05-15 NOTE — THERAPY DISCHARGE
David Marie  : 1965  Primary: [facility] ([facility]res[facility] CHUA)  Secondary:  [facility]  [address]  [address]  [place] [address]  Phone: [phone]  Fax: [phone] Plan Frequency: 1-2x/wk for 4-6 weeks    Plan of Care/Certification Expiration Date: 24        Plan of Care/Certification Expiration Date:  Plan of Care/Certification Expiration Date: 24    Frequency/Duration: Plan Frequency: 1-2x/wk for 4-6 weeks      Time In/Out:          PT Visit Info:    Total # of Visits to Date: 4  Canceled Appointment: 2      Visit Count:  5                OUTPATIENT PHYSICAL THERAPY:             Discharge Summary 5/15/2024               Episode (R knee pain)         Treatment Diagnosis:     Pain in joint of right knee  Stiffness of right knee, not elsewhere classified  Effusion, right knee  Medical/Referring Diagnosis:    Unilateral primary osteoarthritis, right knee [M17.11]    Referring Physician:  Rosey Shanks MD MD Orders:  PT Eval and Treat, 1-2x/wk for 4-6 weeks  Return MD Appt:  24  Date of Onset:  Onset Date: 24     Allergies:  Bee venom  Restrictions/Precautions:    None      Medications Last Reviewed:  5/15/2024     SUBJECTIVE   History of Injury/Illness (Reason for Referral):  Pt had his foot stuck under an ottoman and turned and felt a pop in his R knee. Notes it gets stuck now and gives way. He also reports it was flared up at [employer] while in PT for his R shoulder which is a worker's comp issue. He is still having R shoulder issues and is getting another MRI soon. Pt reports prior R knee surgery but not sure what it was but that recovery was at least 6 months. He also has a R shoulder surgery history in  as well as lower back surgery in .   Patient Stated Goal(s):  \"strengthen, pain elimination\"  Initial Pain Level:       /10   Post Session Pain Level:      /10  Past Medical History/Comorbidities:   Mr. Marie  has a past medical history of Cancer (HCC)

## 2024-05-20 ENCOUNTER — APPOINTMENT (OUTPATIENT)
Dept: PHYSICAL THERAPY | Age: 59
End: 2024-05-20
Payer: COMMERCIAL

## 2024-05-22 ENCOUNTER — APPOINTMENT (OUTPATIENT)
Dept: PHYSICAL THERAPY | Age: 59
End: 2024-05-22
Payer: COMMERCIAL

## 2024-05-30 NOTE — PROGRESS NOTES
David Marie  : 1965  Primary: [facility] ([facility] CHUA)  Secondary:  [facility]  [address]  [address]  [place] [address]  Phone: [phone]  Fax: [phone] Plan Frequency: 1-2x/week x 90 days  Plan of Care/Certification Expiration Date: 24        Plan of Care/Certification Expiration Date:  Plan of Care/Certification Expiration Date: 24    Frequency/Duration: Plan Frequency: 1-2x/week x 90 days      Time In/Out:   Time In: 1104  Time Out: 1152      PT Visit Info:    Progress Note Due Date: 24  Total # of Visits to Date: 1      Visit Count:  1    OUTPATIENT PHYSICAL THERAPY:   Treatment Note 2024       Episode  (S/P R knee scope 24)               Treatment Diagnosis:    Acute pain of right knee  Muscle weakness (generalized)  Difficulty in walking, not elsewhere classified  Swelling of joint of right knee  Medical/Referring Diagnosis:    Other specified postprocedural states    Referring Physician:  Triston Quinones MD MD Orders:  PT Eval and Treat   Return MD Appt:  24   Date of Onset:  Onset Date: 24   S/P lateral R menisectomy; lateral tibial plateau chondroplasty; PF joint chondroplasty; large loose body removal from anterior tissue  Allergies:   Bee venom  Restrictions/Precautions:   TDWB until MD changes       Interventions Planned (Treatment may consist of any combination of the following):     See Assessment Note    Subjective Comments:   Patient c/o R knee pain, stiffness, weakness and immobility.   Initial Pain Level:: Right Knee 2/10  Post Session Pain Level:  Right  Knee 2/10  Medications Last Reviewed:  2024  Updated Objective Findings:  See Evaluation Note from today; R knee AROM: 3-70deg w/ surgical dressing in place.   Treatment   THERAPEUTIC EXERCISE: (25 minutes):    Exercises per grid below to improve mobility, strength, and coordination.  Required minimal visual, verbal, and tactile cues to promote proper body alignment,

## 2024-05-31 ENCOUNTER — HOSPITAL ENCOUNTER (OUTPATIENT)
Dept: PHYSICAL THERAPY | Age: 59
Setting detail: RECURRING SERIES
End: 2024-05-31
Payer: COMMERCIAL

## 2024-05-31 DIAGNOSIS — M62.81 MUSCLE WEAKNESS (GENERALIZED): ICD-10-CM

## 2024-05-31 DIAGNOSIS — M25.461 SWELLING OF JOINT OF RIGHT KNEE: ICD-10-CM

## 2024-05-31 DIAGNOSIS — M25.561 ACUTE PAIN OF RIGHT KNEE: Primary | ICD-10-CM

## 2024-05-31 DIAGNOSIS — R26.2 DIFFICULTY IN WALKING, NOT ELSEWHERE CLASSIFIED: ICD-10-CM

## 2024-05-31 PROCEDURE — 97161 PT EVAL LOW COMPLEX 20 MIN: CPT

## 2024-05-31 PROCEDURE — 97110 THERAPEUTIC EXERCISES: CPT

## 2024-05-31 ASSESSMENT — PAIN DESCRIPTION - LOCATION: LOCATION: KNEE

## 2024-05-31 ASSESSMENT — PAIN DESCRIPTION - ORIENTATION: ORIENTATION: RIGHT

## 2024-05-31 ASSESSMENT — PAIN DESCRIPTION - DESCRIPTORS: DESCRIPTORS: ACHING;SORE;TIGHTNESS;TENDER

## 2024-05-31 ASSESSMENT — PAIN DESCRIPTION - PAIN TYPE: TYPE: SURGICAL PAIN

## 2024-05-31 ASSESSMENT — PAIN SCALES - GENERAL: PAINLEVEL_OUTOF10: 2

## 2024-06-04 ENCOUNTER — HOSPITAL ENCOUNTER (OUTPATIENT)
Dept: PHYSICAL THERAPY | Age: 59
Setting detail: RECURRING SERIES
Discharge: HOME | End: 2024-06-07
Payer: COMMERCIAL

## 2024-06-04 PROCEDURE — 97110 THERAPEUTIC EXERCISES: CPT

## 2024-06-04 ASSESSMENT — PAIN SCALES - GENERAL: PAINLEVEL_OUTOF10: 0

## 2024-06-04 NOTE — PROGRESS NOTES
David Marie  : 1965  Primary: [facility] ([facility] CHUA)  Secondary:  [facility]  [address]  [address]  [place] [address]  Phone: [phone]  Fax: [phone] Plan Frequency: 1-2x/week x 90 days  Plan of Care/Certification Expiration Date: 24        Plan of Care/Certification Expiration Date:  Plan of Care/Certification Expiration Date: 24    Frequency/Duration: Plan Frequency: 1-2x/week x 90 days      Time In/Out:   Time In: 730  Time Out: 815      PT Visit Info:    Progress Note Due Date: 24  Total # of Visits to Date: 2      Visit Count:  2    OUTPATIENT PHYSICAL THERAPY:   Treatment Note 2024       Episode  (S/P R knee scope 24)               Treatment Diagnosis:    Acute pain of right knee  Muscle weakness (generalized)  Difficulty in walking, not elsewhere classified  Swelling of joint of right knee  Medical/Referring Diagnosis:    Other specified postprocedural states    Referring Physician:  Triston Quinones MD MD Orders:  PT Eval and Treat   Return MD Appt:  24   Date of Onset:  Onset Date: 24   S/P lateral R menisectomy; lateral tibial plateau chondroplasty; PF joint chondroplasty; large loose body removal from anterior tissue  Allergies:   Bee venom  Restrictions/Precautions:   Weight Bearing Status: WBAT      Interventions Planned (Treatment may consist of any combination of the following):     See Assessment Note    Subjective Comments:   Patient reports he is doing well no real pain. Put band aids on his wound and now they are pulling and wont come off without taking off his steri strips.   Initial Pain Level::     0/10  Post Session Pain Level:       0/10  Medications Last Reviewed:  2024  Updated Objective Findings:  None Today; R knee AROM: 3-70deg w/ surgical dressing in place.   Treatment   THERAPEUTIC EXERCISE: (40 minutes):    Exercises per grid below to improve mobility, strength, and coordination.  Required minimal visual,

## 2024-06-10 ENCOUNTER — HOSPITAL ENCOUNTER (OUTPATIENT)
Dept: PHYSICAL THERAPY | Age: 59
Setting detail: RECURRING SERIES
Discharge: HOME | End: 2024-06-13
Payer: COMMERCIAL

## 2024-06-10 PROCEDURE — 97110 THERAPEUTIC EXERCISES: CPT

## 2024-06-10 ASSESSMENT — PAIN SCALES - GENERAL: PAINLEVEL_OUTOF10: 0

## 2024-06-10 NOTE — PROGRESS NOTES
David Marie  : 1965  Primary: [facility] ([facility]resita CHUA)  Secondary:  [facility]  [address]  [address]  [place] [address]  Phone: [phone]  Fax: [phone] Plan Frequency: 1-2x/week x 90 days  Plan of Care/Certification Expiration Date: 24        Plan of Care/Certification Expiration Date:  Plan of Care/Certification Expiration Date: 24    Frequency/Duration: Plan Frequency: 1-2x/week x 90 days      Time In/Out:   Time In: 730  Time Out: 815      PT Visit Info:    Progress Note Due Date: 24  Total # of Visits to Date: 3      Visit Count:  3    OUTPATIENT PHYSICAL THERAPY:   Treatment Note 6/10/2024       Episode  (S/P R knee scope 24)               Treatment Diagnosis:    Acute pain of right knee  Muscle weakness (generalized)  Difficulty in walking, not elsewhere classified  Swelling of joint of right knee  Medical/Referring Diagnosis:    Other specified postprocedural states    Referring Physician:  Triston Quinones MD MD Orders:  PT Eval and Treat   Return MD Appt:  24   Date of Onset:  Onset Date: 24   S/P lateral R menisectomy; lateral tibial plateau chondroplasty; PF joint chondroplasty; large loose body removal from anterior tissue  Allergies:   Bee venom  Restrictions/Precautions:   Weight Bearing Status: WBAT      Interventions Planned (Treatment may consist of any combination of the following):     See Assessment Note    Subjective Comments:   Patient reports no pain and has not been using crutches. Feeling really good. No swelling either.   Initial Pain Level::     0/10  Post Session Pain Level:       0/10  Medications Last Reviewed:  6/10/2024  Updated Objective Findings:  None Today; R knee AROM: 140* flexion  Treatment   THERAPEUTIC EXERCISE: (40 minutes):    Exercises per grid below to improve mobility, strength, and coordination.  Required minimal visual, verbal, and tactile cues to promote proper body alignment, promote proper body

## 2024-06-12 ENCOUNTER — HOSPITAL ENCOUNTER (OUTPATIENT)
Dept: PHYSICAL THERAPY | Age: 59
Setting detail: RECURRING SERIES
End: 2024-06-12
Payer: COMMERCIAL

## 2024-06-18 ENCOUNTER — HOSPITAL ENCOUNTER (OUTPATIENT)
Dept: PHYSICAL THERAPY | Age: 59
Setting detail: RECURRING SERIES
Discharge: HOME | End: 2024-06-21
Payer: COMMERCIAL

## 2024-06-18 PROCEDURE — 97110 THERAPEUTIC EXERCISES: CPT

## 2024-06-18 ASSESSMENT — PAIN SCALES - GENERAL
PAINLEVEL_OUTOF10: 0
PAINLEVEL_OUTOF10: 0

## 2024-06-18 NOTE — PROGRESS NOTES
alignment, promote proper body posture, promote proper body mechanics, and promote proper body breathing techniques.  Progressed range, repetitions, and complexity of movement as indicated.  MANUAL THERAPY: (0 minutes):   Joint mobilization and Soft tissue mobilization was utilized and necessary because of the patient's restricted joint motion and restricted motion of soft tissue.   Date:  6/4/24   Parameters   Patellar mobility     MODALITIES:       *  Vasopneumatic Therapy utilizing the Game Ready system in order to provide analgesia and reduce inflammation and edema.     Body Part: Right Knee       Date:  5.31.24 Date:  6/4/24 Date:  6/10/24 Date:  6/18/24   Activity/Exercise Parameters Parameters Parameters Parameters   Bike   5 mins working ROM 5 mins working ROM 5 mins working ROM   Gait training  Done WBAT with B crutches and working down to no crutches Walking no device    QS 10x 20x in long sit R 20x supine w/ heel elevated    SLR 10x 2x10 in long sit R 2x10 in long sit R 2x10 in long sit R   Hip abduction  2x10 sidelying R 2x10 sidelying R    Hip extension  2x10 prone R 2x10 prone R 2x10 prone R   Heel slides 10x 20x w/ bandages  20x w/o bandages     AP 10x      SAQ 5x short range 20x R on half foam 2#      HS/GS stretch w/ strap 3 x 30sec  10x seated R    Hamstring curl  20x prone  20x 2.5#   Step up   10x B 4 inch step 15x R 6 inch step knee drive   Side step up   10x B 4 inch step 15x R 6 inch step   Heel tap    15x R 6 inch step   Leg press    20x 87#    TKE   20x purple band 20x purple band   Bridge    20x 20x purple band   Clamshell    2x10 blue band B   LAQ   20x 5#  20x w/ ball squeeze 2.5# R   Balance   On foam feet together and semi tandem head turns and nods      Access Code: [email or web address]  URL: [email or web address]  Date: 05/31/2024  Prepared by: Abbi Bearden    Exercises  - Supine Active Ankle Pumps  - 2 x daily - 7 x weekly - 2 sets - 10 reps  - Supine Quad Set  - 2 x daily - 7 x

## 2024-06-19 ENCOUNTER — HOSPITAL ENCOUNTER (OUTPATIENT)
Dept: PHYSICAL THERAPY | Age: 59
Setting detail: RECURRING SERIES
Discharge: HOME | End: 2024-06-22
Payer: COMMERCIAL

## 2024-06-19 PROCEDURE — 97110 THERAPEUTIC EXERCISES: CPT

## 2024-06-19 ASSESSMENT — PAIN SCALES - GENERAL: PAINLEVEL_OUTOF10: 0

## 2024-06-19 NOTE — PROGRESS NOTES
side   40ft x2 forwards     Access Code: [email or web address]  URL: [email or web address]  Date: 05/31/2024  Prepared by: Abbi Bearden    Exercises  - Supine Active Ankle Pumps  - 2 x daily - 7 x weekly - 2 sets - 10 reps  - Supine Quad Set  - 2 x daily - 7 x weekly - 2 sets - 10 reps  - Long Sitting Quad Set with Towel Roll Under Heel  - 2 x daily - 7 x weekly - 2 sets - 10 reps  - Hooklying Heel Slide  - 2 x daily - 7 x weekly - 2 sets - 10 reps  - Supine Knee Extension Strengthening  - 2 x daily - 7 x weekly - 2 sets - 10 reps  - Small Range Straight Leg Raise  - 2 x daily - 7 x weekly - 2 sets - 10 reps  - Long Sitting Calf Stretch with Strap  - 2 x daily - 7 x weekly - 1 sets - 5 reps - 20 hold  - Seated Calf Stretch with Strap  - 2 x daily - 7 x weekly - 1 sets - 5 reps - 20 hold  Treatment/Session Summary:    Treatment Assessment:   Patient challenged w/ new hip strengthening and balance work. No pain pre or post session.   Communication/Consultation:  None today  Equipment provided today:  HEP w/ instruction sheet  Recommendations/Intent for next treatment session: Next visit will focus on advancements to more challenging mobility and R knee stretching and strengthening exercises.    >Total Treatment Billable Duration:  40 minutes therex  Time In: 0730  Time Out: 0815    Josephine Rosas PT         Charge Capture  Events  [employer] Portal  Appt Desk  Attendance Report     Future Appointments   Date Time Provider Department Center   6/24/2024  7:30 AM Josephine Rosas PT [facility]   6/26/2024  7:30 AM Josephine Rosas PT [facility]

## 2024-06-24 ENCOUNTER — HOSPITAL ENCOUNTER (OUTPATIENT)
Dept: PHYSICAL THERAPY | Age: 59
Setting detail: RECURRING SERIES
End: 2024-06-24
Payer: COMMERCIAL

## 2024-06-26 ENCOUNTER — HOSPITAL ENCOUNTER (OUTPATIENT)
Dept: PHYSICAL THERAPY | Age: 59
Setting detail: RECURRING SERIES
End: 2024-06-26
Payer: COMMERCIAL

## 2024-07-15 ENCOUNTER — HOSPITAL ENCOUNTER (OUTPATIENT)
Dept: PHYSICAL THERAPY | Age: 59
Setting detail: RECURRING SERIES
Discharge: HOME | End: 2024-07-18
Payer: COMMERCIAL

## 2024-07-15 PROCEDURE — 97110 THERAPEUTIC EXERCISES: CPT

## 2024-07-15 ASSESSMENT — PAIN SCALES - GENERAL: PAINLEVEL_OUTOF10: 0

## 2024-07-15 NOTE — PROGRESS NOTES
David Marie  : 1965  Primary: [facility] ([facility]UA)  Secondary:  [facility]  [address]  [address]  [place] [address]  Phone: [phone]  Fax: [phone] Plan Frequency: 1-2x/week x 90 days  Plan of Care/Certification Expiration Date: 24        Plan of Care/Certification Expiration Date:  Plan of Care/Certification Expiration Date: 24    Frequency/Duration: Plan Frequency: 1-2x/week x 90 days      Time In/Out:   Time In: 730  Time Out: 815      PT Visit Info:    Progress Note Due Date: 24  Total # of Visits to Date: 6      Visit Count:  6    OUTPATIENT PHYSICAL THERAPY:   Treatment Note 7/15/2024       Episode  (S/P R knee scope 24)               Treatment Diagnosis:    Acute pain of right knee  Muscle weakness (generalized)  Difficulty in walking, not elsewhere classified  Swelling of joint of right knee  Medical/Referring Diagnosis:    Other specified postprocedural states    Referring Physician:  Triston Quinones MD MD Orders:  PT Eval and Treat   Return MD Appt:  24   Date of Onset:  Onset Date: 24   S/P lateral R menisectomy; lateral tibial plateau chondroplasty; PF joint chondroplasty; large loose body removal from anterior tissue  Allergies:   Bee venom  Restrictions/Precautions:   Weight Bearing Status: WBAT      Interventions Planned (Treatment may consist of any combination of the following):     See Assessment Note    Subjective Comments:   Patient reports no pain in his knee but reports stiffness/soreness in his shoulder as he recently had shoulder surgery. Reports his MD for his shoulder wanted him to take some time off from his knee PT while his shoulder recovered.   Initial Pain Level::     0/10  Post Session Pain Level:       0/10  Medications Last Reviewed:  7/15/2024  Updated Objective Findings:  None Today; R knee AROM: 140* flexion  Treatment   THERAPEUTIC EXERCISE: (40 minutes):    Exercises per grid below to improve mobility,

## 2024-07-17 ENCOUNTER — HOSPITAL ENCOUNTER (OUTPATIENT)
Dept: PHYSICAL THERAPY | Age: 59
Setting detail: RECURRING SERIES
Discharge: HOME | End: 2024-07-20
Payer: COMMERCIAL

## 2024-07-17 PROCEDURE — 97110 THERAPEUTIC EXERCISES: CPT

## 2024-07-17 ASSESSMENT — PAIN SCALES - GENERAL: PAINLEVEL_OUTOF10: 0

## 2024-07-17 NOTE — PROGRESS NOTES
promote proper body mechanics, and promote proper body breathing techniques.  Progressed range, repetitions, and complexity of movement as indicated.  MANUAL THERAPY: (0 minutes):   Joint mobilization and Soft tissue mobilization was utilized and necessary because of the patient's restricted joint motion and restricted motion of soft tissue.   Date:  6/4/24   Parameters   Patellar mobility     MODALITIES:       *  Vasopneumatic Therapy utilizing the Game Ready system in order to provide analgesia and reduce inflammation and edema.     Body Part: Right Knee       Date:  6/4/24 Date:  6/10/24 Date:  6/18/24 Date:  6/19/24 Date:  7/15/24 Date:  7/17/24   Activity/Exercise Parameters Parameters Parameters Parameters Parameters Parameters   Bike  5 mins working ROM 5 mins working ROM 5 mins working ROM 5 mins working ROM, L3 5 mins working ROM, L3 5 mins working ROM, L3   Gait training  WBAT with B crutches and working down to no crutches Walking no device       QS 20x in long sit R 20x supine w/ heel elevated       SLR 2x10 in long sit R 2x10 in long sit R 2x10 in long sit R 2x10 in long sit R 2x10 in long sit R 2x10 in long sit R   Hip abduction 2x10 sidelying R 2x10 sidelying R  2x10 B lime band on foam  2x10 4# on foam in // 2x10 4# on foam in // B   Hip extension 2x10 prone R 2x10 prone R 2x10 prone R 2x10 B lime band on foam 2x10 4# on foam in // 2x10 4# on foam in // B   Heel slides 20x w/ bandages  20x w/o bandages        AP         SAQ 20x R on half foam 2#     20x 4#  20x 4#    HS/GS stretch w/ strap  10x seated R   10x R seated 10x R seated   Calf stretch      10x on wedge   Hamstring curl 20x prone  20x 2.5#  20x 4# in // 20x 4# in //   Quad stretch     10x R supine 10x R supine   Step up  10x B 4 inch step 15x R 6 inch step knee drive   10x R 6 inch step knee drive   Side step up  10x B 4 inch step 15x R 6 inch step   10x R 6 inch step    Heel tap   15x R 6 inch step   10x R 6 inch step   Leg press   20x 87#

## 2024-07-22 ENCOUNTER — HOSPITAL ENCOUNTER (OUTPATIENT)
Dept: PHYSICAL THERAPY | Age: 59
Setting detail: RECURRING SERIES
Discharge: HOME | End: 2024-07-25
Payer: COMMERCIAL

## 2024-07-22 PROCEDURE — 97110 THERAPEUTIC EXERCISES: CPT

## 2024-07-22 NOTE — PROGRESS NOTES
David Marie  : 1965  Primary: Nida Ladd (Teresita CHUA)  Secondary:  [facility]  [address]  [address]  [place] [address]  Phone: [phone]  Fax: [phone] Plan Frequency: 1-2x/week x 90 days  Plan of Care/Certification Expiration Date: 24        Plan of Care/Certification Expiration Date:  Plan of Care/Certification Expiration Date: 24    Frequency/Duration: Plan Frequency: 1-2x/week x 90 days      Time In/Out:   Time In: 730  Time Out: 818      PT Visit Info:    Progress Note Due Date: 24  Total # of Visits to Date: 8      Visit Count:  8    OUTPATIENT PHYSICAL THERAPY:   Treatment Note 2024       Episode  (S/P R knee scope 24)               Treatment Diagnosis:    Acute pain of right knee  Muscle weakness (generalized)  Difficulty in walking, not elsewhere classified  Swelling of joint of right knee  Medical/Referring Diagnosis:    Other specified postprocedural states    Referring Physician:  Triston Quinones MD MD Orders:  PT Eval and Treat   Return MD Appt:  24   Date of Onset:  Onset Date: 24   S/P lateral R menisectomy; lateral tibial plateau chondroplasty; PF joint chondroplasty; large loose body removal from anterior tissue  Allergies:   Bee venom  Restrictions/Precautions:   Weight Bearing Status: WBAT      Interventions Planned (Treatment may consist of any combination of the following):     See Assessment Note    Subjective Comments:   Patient reports no pain in his knee. Shoulder feels stiff when he wakes up. Can't start PT on it yet though.   Initial Pain Level::     0/10  Post Session Pain Level:       0/10  Medications Last Reviewed:  2024  Updated Objective Findings:  None Today; R knee AROM: 140* flexion  Treatment   THERAPEUTIC EXERCISE: (40 minutes):    Exercises per grid below to improve mobility, strength, and coordination.  Required minimal visual, verbal, and tactile cues to promote proper body alignment, promote

## 2024-07-24 ENCOUNTER — HOSPITAL ENCOUNTER (OUTPATIENT)
Dept: PHYSICAL THERAPY | Age: 59
Setting detail: RECURRING SERIES
Discharge: HOME | End: 2024-07-27
Payer: COMMERCIAL

## 2024-07-24 PROCEDURE — 97110 THERAPEUTIC EXERCISES: CPT

## 2024-07-24 NOTE — PROGRESS NOTES
David Marie  : 1965  Primary: Nida Ladd (Teresita CHUA)  Secondary:  [facility]  [address]  [address]  [place] [address]  Phone: [phone]  Fax: [phone] Plan Frequency: 1-2x/week x 90 days  Plan of Care/Certification Expiration Date: 24        Plan of Care/Certification Expiration Date:  Plan of Care/Certification Expiration Date: 24    Frequency/Duration: Plan Frequency: 1-2x/week x 90 days      Time In/Out:   Time In: 730  Time Out: 823      PT Visit Info:    Progress Note Due Date: 24  Total # of Visits to Date: 9      Visit Count:  9    OUTPATIENT PHYSICAL THERAPY:   Treatment Note 2024       Episode  (S/P R knee scope 24)               Treatment Diagnosis:    Acute pain of right knee  Muscle weakness (generalized)  Difficulty in walking, not elsewhere classified  Swelling of joint of right knee  Medical/Referring Diagnosis:    Other specified postprocedural states    Referring Physician:  Triston Quinones MD MD Orders:  PT Eval and Treat   Return MD Appt:  24   Date of Onset:  Onset Date: 24   S/P lateral R menisectomy; lateral tibial plateau chondroplasty; PF joint chondroplasty; large loose body removal from anterior tissue  Allergies:   Bee venom  Restrictions/Precautions:   Weight Bearing Status: WBAT      Interventions Planned (Treatment may consist of any combination of the following):     See Assessment Note    Subjective Comments:   Patient reports no pain in knee. Has some soreness in shoulder.   Initial Pain Level::     0/10  Post Session Pain Level:       0/10  Medications Last Reviewed:  2024  Updated Objective Findings:  None Today; R knee AROM: 140* flexion  Treatment   THERAPEUTIC EXERCISE: (45 minutes):    Exercises per grid below to improve mobility, strength, and coordination.  Required minimal visual, verbal, and tactile cues to promote proper body alignment, promote proper body posture, promote proper body

## 2024-07-29 ENCOUNTER — HOSPITAL ENCOUNTER (OUTPATIENT)
Dept: PHYSICAL THERAPY | Age: 59
Setting detail: RECURRING SERIES
Discharge: HOME | End: 2024-08-01
Payer: COMMERCIAL

## 2024-07-29 PROCEDURE — 97110 THERAPEUTIC EXERCISES: CPT

## 2024-07-29 ASSESSMENT — PAIN SCALES - GENERAL: PAINLEVEL_OUTOF10: 0

## 2024-07-29 NOTE — PROGRESS NOTES
David Marie  : 1965  Primary: Nida Ladd (Teresita CHUA)  Secondary:  [facility]  [address]  [address]  [place] [address]  Phone: [phone]  Fax: [phone] Plan Frequency: 1-2x/week x 90 days  Plan of Care/Certification Expiration Date: 24        Plan of Care/Certification Expiration Date:  Plan of Care/Certification Expiration Date: 24    Frequency/Duration: Plan Frequency: 1-2x/week x 90 days      Time In/Out:   Time In: 730  Time Out: 815      PT Visit Info:    Progress Note Due Date: 24  Total # of Visits to Date: 10      Visit Count:  10                OUTPATIENT PHYSICAL THERAPY:             Progress Report 2024               Episode (S/P R knee scope 24)         Treatment Diagnosis:     No data found  Medical/Referring Diagnosis:    Other specified postprocedural states    Referring Physician:  Triston Quinones MD MD Orders:  PT Eval and Treat   Return MD Appt:  24  Date of Onset:  Onset Date: 24    S/P lateral R menisectomy; lateral tibial plateau chondroplasty; PF joint chondroplasty; large loose body removal from anterior tissue   Allergies:  Bee venom  Restrictions/Precautions:    None      Medications Last Reviewed:  2024     SUBJECTIVE   History of Injury/Illness (Reason for Referral):  Patient reports hx of R knee and shoulder pain for several months. Has been seen for OP PT for both. Decided to get both fixed. S/P lateral R menisectomy; lateral tibial plateau chondroplasty; PF joint chondroplasty; large loose body removal from anterior tissue on 24, and plans to have RCR in 3 weeks. He is taking Oxycodone for pain management.   Patient Stated Goal(s):  \"recover 100% strength and mobility\"  Initial Pain Level:      0/10   Post Session Pain Level:     0/10  Past Medical History/Comorbidities:   Mr. Marie  has a past medical history of Cancer (HCC) and Chronic pain.  Mr. Marie  has a past surgical history that

## 2024-07-29 NOTE — PROGRESS NOTES
David Marie  : 1965  Primary: [employer] (Teresita CHUA)  Secondary:  [facility]  [address]  [address]  [place] [address]  Phone: [phone]  Fax: [phone] Plan Frequency: 1-2x/week x 90 days  Plan of Care/Certification Expiration Date: 24        Plan of Care/Certification Expiration Date:  Plan of Care/Certification Expiration Date: 24    Frequency/Duration: Plan Frequency: 1-2x/week x 90 days      Time In/Out:   Time In: 730  Time Out: 815      PT Visit Info:    Progress Note Due Date: 24  Total # of Visits to Date: 10      Visit Count:  10    OUTPATIENT PHYSICAL THERAPY:   Treatment Note 2024       Episode  (S/P R knee scope 24)               Treatment Diagnosis:    Acute pain of right knee  Muscle weakness (generalized)  Difficulty in walking, not elsewhere classified  Swelling of joint of right knee  Medical/Referring Diagnosis:    Other specified postprocedural states    Referring Physician:  Triston Quinones MD MD Orders:  PT Eval and Treat   Return MD Appt:  24   Date of Onset:  Onset Date: 24   S/P lateral R menisectomy; lateral tibial plateau chondroplasty; PF joint chondroplasty; large loose body removal from anterior tissue  Allergies:   Bee venom  Restrictions/Precautions:   Weight Bearing Status: WBAT      Interventions Planned (Treatment may consist of any combination of the following):     See Assessment Note    Subjective Comments:   Patient reports he is doing well. Took out the bumper in his sling. No pain in knee.   Initial Pain Level::     0/10  Post Session Pain Level:       0/10  Medications Last Reviewed:  2024  Updated Objective Findings:  None Today; R knee AROM: 140* flexion  Treatment   THERAPEUTIC EXERCISE: (45 minutes):    Exercises per grid below to improve mobility, strength, and coordination.  Required minimal visual, verbal, and tactile cues to promote proper body alignment, promote proper body posture,

## 2024-07-31 ENCOUNTER — HOSPITAL ENCOUNTER (OUTPATIENT)
Dept: PHYSICAL THERAPY | Age: 59
Setting detail: RECURRING SERIES
Discharge: HOME | End: 2024-08-03
Payer: COMMERCIAL

## 2024-07-31 PROCEDURE — 97110 THERAPEUTIC EXERCISES: CPT

## 2024-07-31 ASSESSMENT — PAIN SCALES - GENERAL: PAINLEVEL_OUTOF10: 0

## 2024-07-31 NOTE — PROGRESS NOTES
David Marie  : 1965  Primary: [facility] (Teresita CHUA)  Secondary:  [place] [address]  [address]  [facility] [address]  Phone: [phone]  Fax: [phone] Plan Frequency: 1-2x/week x 90 days  Plan of Care/Certification Expiration Date: 24        Plan of Care/Certification Expiration Date:  Plan of Care/Certification Expiration Date: 24    Frequency/Duration: Plan Frequency: 1-2x/week x 90 days      Time In/Out:   Time In: 730  Time Out: 815      PT Visit Info:    Progress Note Due Date: 24  Total # of Visits to Date: 11      Visit Count:  11    OUTPATIENT PHYSICAL THERAPY:   Treatment Note 2024       Episode  (S/P R knee scope 24)               Treatment Diagnosis:    Acute pain of right knee  Muscle weakness (generalized)  Difficulty in walking, not elsewhere classified  Swelling of joint of right knee  Medical/Referring Diagnosis:    Other specified postprocedural states    Referring Physician:  Triston Quinones MD MD Orders:  PT Eval and Treat   Return MD Appt:  24   Date of Onset:  Onset Date: 24   S/P lateral R menisectomy; lateral tibial plateau chondroplasty; PF joint chondroplasty; large loose body removal from anterior tissue  Allergies:   Bee venom  Restrictions/Precautions:   Weight Bearing Status: WBAT      Interventions Planned (Treatment may consist of any combination of the following):     See Assessment Note    Subjective Comments:   Patient reports knee feels fine but did have a needle type feeling on the side of his knee yesterday.  Initial Pain Level::     0/10  Post Session Pain Level:       0/10  Medications Last Reviewed:  2024  Updated Objective Findings:  None Today; R knee AROM: 140* flexion  Treatment   THERAPEUTIC EXERCISE: (45 minutes):    Exercises per grid below to improve mobility, strength, and coordination.  Required minimal visual, verbal, and tactile cues to promote proper body

## 2024-08-06 ENCOUNTER — HOSPITAL ENCOUNTER (OUTPATIENT)
Dept: PHYSICAL THERAPY | Age: 59
Setting detail: RECURRING SERIES
Discharge: HOME | End: 2024-08-09
Payer: COMMERCIAL

## 2024-08-06 DIAGNOSIS — M25.511 RIGHT SHOULDER PAIN, UNSPECIFIED CHRONICITY: Primary | ICD-10-CM

## 2024-08-06 DIAGNOSIS — M25.611 STIFFNESS OF RIGHT SHOULDER, NOT ELSEWHERE CLASSIFIED: ICD-10-CM

## 2024-08-06 PROCEDURE — 97140 MANUAL THERAPY 1/> REGIONS: CPT

## 2024-08-06 PROCEDURE — 97161 PT EVAL LOW COMPLEX 20 MIN: CPT

## 2024-08-06 ASSESSMENT — PAIN SCALES - GENERAL: PAINLEVEL_OUTOF10: 0

## 2024-08-06 NOTE — PROGRESS NOTES
movement as indicated.   Date:  8/6/24 Date:   Date:     Activity/Exercise Parameters Parameters Parameters   HEP Pt told to continue w/ pendulums at home                                           MANUAL THERAPY: (25 minutes):   Joint mobilization and Soft tissue mobilization was utilized and necessary because of the patient's restricted joint motion, painful spasm, loss of articular motion, and restricted motion of soft tissue.   Date:  8/6/24   Parameters   Manual stretching into flexion, abduction, ER, and IR to tolerance    STM to scar   STM to R upper trap/levator            Treatment/Session Summary:    Treatment Assessment:   Pt was educated to continue w/ HEP his physician gave him at this time. He follows up with MD today and may progress next session. No pain pre or post. Some tightness noted in his scar and cervical/scapular muscles. His ROM is doing well.   Communication/Consultation:  Therapy Evaluation sent to referring provider  Equipment provided today:  HEP  Recommendations/Intent for next treatment session: Next visit will focus on progression of functional tasks as tolerated.    >Total Treatment Billable Duration:  25 minutes manual   Time In: 0730  Time Out: 0820    Josephine Rosas PT         Charge Capture  Events  [employer] Portal  Appt Desk  Attendance Report     Future Appointments   Date Time Provider Department Center   8/8/2024  7:30 AM Josephine Rosas PT [facility]   8/12/2024  9:45 AM Josephine Rosas, PT [facility]   8/14/2024  7:30 AM Josephine Rosas, PT [facility]   8/19/2024  7:30 AM Gaston Mcdermott PT [facility]   8/21/2024  7:30 AM Gaston Mcdermott PT [facility]   8/26/2024  7:30 AM Josephine Rosas PT [facility]   8/28/2024  7:30 AM Josephine Rosas PT [facility]   9/3/2024  7:30 AM Josephine Rosas, PT [facility]   9/5/2024  7:30 AM Josephine Rosas, PT [facility]

## 2024-08-06 NOTE — THERAPY EVALUATION
and decrease pain levels.   2. Pt will have R shoulder flexion ROM of 170* or more w/ pain 2/10 or less in order to improve functional abilities.   3. Pt will have R shoulder abduction ROM of 170* or more w/ pain 2/10 or less in order to improve functional abilities.    Long Term Goals: 6 weeks  1. Pt will have QUICKDASH score of 24 or less in order to display decreased pain and decreased functional impairments.   2. Pt will have R shoulder ER ROM of 65* or more w/ pain 1/10 or less in order to improve functional abilities.   3. Pt will have R shoulder IR ROM of 65 *or more w/ pain 1/10 or less in order to improve functional abilities.   4. 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            Medical Necessity:   > Patient is expected to demonstrate progress in strength, range of motion, balance, and coordination to increase independence with functional tasks.  Reason For Services/Other Comments:  > Patient continues to demonstrate capacity to improve strength, ROM, balance, mobility which will increase independence.      Regarding David Marie's therapy, I certify that the treatment plan above will be carried out by a therapist or under their direction.  Thank you for this referral,  Josephine Rosas, DIANA     Referring Physician Signature: Josephine Perdomo* _______________________________ Date _____________        Charge Capture  Events  Appt Desk  Attendance Report

## 2024-08-08 ENCOUNTER — HOSPITAL ENCOUNTER (OUTPATIENT)
Dept: PHYSICAL THERAPY | Age: 59
Setting detail: RECURRING SERIES
Discharge: HOME | End: 2024-08-11
Payer: COMMERCIAL

## 2024-08-08 PROCEDURE — 97110 THERAPEUTIC EXERCISES: CPT

## 2024-08-08 PROCEDURE — 97140 MANUAL THERAPY 1/> REGIONS: CPT

## 2024-08-08 ASSESSMENT — PAIN SCALES - GENERAL: PAINLEVEL_OUTOF10: 0

## 2024-08-08 NOTE — PROGRESS NOTES
\David Marie  : 1965  Primary: [facility] ([employer])  Secondary:  [facility]  [address]  [address]  [facility] [address]  Phone: [phone]  Fax: [phone] Plan Frequency: 1-2x/wk for 6 weeks    Plan of Care/Certification Expiration Date: 24        Plan of Care/Certification Expiration Date:  Plan of Care/Certification Expiration Date: 24    Frequency/Duration:   Plan Frequency: 1-2x/wk for 6 weeks      Time In/Out:   Time In: 730  Time Out: 820      PT Visit Info:    Total # of Visits to Date: 2      Visit Count:  2    OUTPATIENT PHYSICAL THERAPY:   Treatment Note 2024       Episode  (R shoulder resurfacing)               Treatment Diagnosis:    Stiffness of right shoulder, not elsewhere classified  Right shoulder pain, unspecified chronicity  Medical/Referring Diagnosis:    Pain in right shoulder [M25.511]  Other chronic pain [G89.29]      Referring Physician:  Josephine Perdomo APRN - NP  MD Orders:  PT Eval and Treat, 1-2x/wk for 4-6 weeks  Return MD Appt:  24   Date of Onset:  Onset Date: 24     Allergies:   Bee venom  Restrictions/Precautions:   Post Surgical Precautions: R shoulder resurfacing      Interventions Planned (Treatment may consist of any combination of the following):     See Assessment Note    Subjective Comments:   Pt reports no pain. Saw MD who told him he didn't have to wear his sling.   Initial Pain Level::     0/10  Post Session Pain Level:       0/10  Medications Last Reviewed:  2024  Updated Objective Findings:  None Today  Treatment   THERAPEUTIC EXERCISE: (30 minutes):    Exercises per grid below to improve mobility, strength, balance, and coordination.  Required minimal verbal and tactile cues to promote proper body alignment, promote proper body posture, and promote proper body mechanics.  Progressed resistance, range, repetitions, and complexity of movement as indicated.   Date:  24

## 2024-08-12 ENCOUNTER — HOSPITAL ENCOUNTER (OUTPATIENT)
Dept: PHYSICAL THERAPY | Age: 59
Setting detail: RECURRING SERIES
Discharge: HOME | End: 2024-08-15
Payer: COMMERCIAL

## 2024-08-12 PROCEDURE — 97110 THERAPEUTIC EXERCISES: CPT

## 2024-08-12 ASSESSMENT — PAIN SCALES - GENERAL: PAINLEVEL_OUTOF10: 0

## 2024-08-12 NOTE — PROGRESS NOTES
I am accessing Mr. Marie's chart as a part of our department's internal chart auditing process.  I certify that Mr. Marie is, or was, a patient in our department.  Thank you,  Panchito Cr, PT  8/12/2024

## 2024-08-12 NOTE — PROGRESS NOTES
\David Marie  : 1965  Primary: [facility] ([employer])  Secondary:  [facility]  [address]  [address]  [facility] [address]  Phone: [phone]  Fax: [phone] Plan Frequency: 1-2x/wk for 6 weeks    Plan of Care/Certification Expiration Date: 24        Plan of Care/Certification Expiration Date:  Plan of Care/Certification Expiration Date: 24    Frequency/Duration:   Plan Frequency: 1-2x/wk for 6 weeks      Time In/Out:   Time In: 0945  Time Out: 1030      PT Visit Info:    Total # of Visits to Date: 3      Visit Count:  3    OUTPATIENT PHYSICAL THERAPY:   Treatment Note 2024       Episode  (R shoulder resurfacing)               Treatment Diagnosis:    Stiffness of right shoulder, not elsewhere classified  Right shoulder pain, unspecified chronicity  Medical/Referring Diagnosis:    Pain in right shoulder [M25.511]  Other chronic pain [G89.29]      Referring Physician:  Josephine Perdomo APRN - NP  MD Orders:  PT Eval and Treat, 1-2x/wk for 4-6 weeks  Return MD Appt:  24   Date of Onset:  Onset Date: 24     Allergies:   Bee venom  Restrictions/Precautions:   Post Surgical Precautions: R shoulder resurfacing      Interventions Planned (Treatment may consist of any combination of the following):     See Assessment Note    Subjective Comments:   Pt reports no pain in his shoulder today. Had a little yesterday and thinks he slept weird and that is what caused it.   Initial Pain Level::     0/10  Post Session Pain Level:       0/10  Medications Last Reviewed:  2024  Updated Objective Findings:  None Today  Treatment   THERAPEUTIC EXERCISE: (40 minutes):    Exercises per grid below to improve mobility, strength, balance, and coordination.  Required minimal verbal and tactile cues to promote proper body alignment, promote proper body posture, and promote proper body mechanics.  Progressed resistance, range, repetitions, and complexity of

## 2024-08-13 ENCOUNTER — HOSPITAL ENCOUNTER (OUTPATIENT)
Dept: PHYSICAL THERAPY | Age: 59
Setting detail: RECURRING SERIES
Discharge: HOME | End: 2024-08-16
Payer: COMMERCIAL

## 2024-08-13 PROCEDURE — 97110 THERAPEUTIC EXERCISES: CPT

## 2024-08-13 ASSESSMENT — PAIN SCALES - GENERAL
PAINLEVEL_OUTOF10: 0
PAINLEVEL_OUTOF10: 0

## 2024-08-19 ENCOUNTER — HOSPITAL ENCOUNTER (OUTPATIENT)
Dept: PHYSICAL THERAPY | Age: 59
Setting detail: RECURRING SERIES
Discharge: HOME | End: 2024-08-22
Payer: COMMERCIAL

## 2024-08-19 PROCEDURE — 97110 THERAPEUTIC EXERCISES: CPT

## 2024-08-19 PROCEDURE — 97140 MANUAL THERAPY 1/> REGIONS: CPT

## 2024-08-19 NOTE — PROGRESS NOTES
Treatment/Session Summary:    Treatment Assessment:   Pt tolerated treatment well with cues offered for scapular setting.  Communication/Consultation:  None today  Equipment provided today:  None  Recommendations/Intent for next treatment session: Next visit will focus on progression of functional tasks as tolerated.    >Total Treatment Billable Duration: 45 minutes therex, 8 minutes manual  Time In: 0730  Time Out: 0823    Gaston Mcdermott, PT         Charge Capture  Events  [employer] Portal  Appt Desk  Attendance Report     Future Appointments   Date Time Provider Department Center   8/21/2024  7:30 AM Gaston Mcdermott, PT [facility]   8/26/2024  7:30 AM Josephine Rosas, PT [facility]   8/28/2024  7:30 AM Josephine Rosas, PT [facility]   9/3/2024  7:30 AM Josephine Rosas, PT [facility]   9/5/2024  7:30 AM Josephine Rosas, PT [facility]

## 2024-08-21 ENCOUNTER — HOSPITAL ENCOUNTER (OUTPATIENT)
Dept: PHYSICAL THERAPY | Age: 59
Setting detail: RECURRING SERIES
Discharge: HOME | End: 2024-08-24
Payer: COMMERCIAL

## 2024-08-21 PROCEDURE — 97110 THERAPEUTIC EXERCISES: CPT

## 2024-08-21 ASSESSMENT — PAIN SCALES - GENERAL: PAINLEVEL_OUTOF10: 0

## 2024-08-21 NOTE — PROGRESS NOTES
\David Marie  : 1965  Primary: [facility] ([employer])  Secondary:  [facility]  [address]  [address]  [facility] [address]  Phone: [phone]  Fax: [phone] Plan Frequency: 1-2x/wk for 6 weeks    Plan of Care/Certification Expiration Date: 24        Plan of Care/Certification Expiration Date:  Plan of Care/Certification Expiration Date: 24    Frequency/Duration:   Plan Frequency: 1-2x/wk for 6 weeks      Time In/Out:   Time In: 730  Time Out: 820      PT Visit Info:    Total # of Visits to Date: 6      Visit Count:  6    OUTPATIENT PHYSICAL THERAPY:   Treatment Note 2024       Episode  (R shoulder resurfacing)               Treatment Diagnosis:    Stiffness of right shoulder, not elsewhere classified  Right shoulder pain, unspecified chronicity  Medical/Referring Diagnosis:    Pain in right shoulder [M25.511]  Other chronic pain [G89.29]      Referring Physician:  Josephine Perdomo APRN - NP  MD Orders:  PT Eval and Treat, 1-2x/wk for 4-6 weeks  Return MD Appt:  24   Date of Onset:  Onset Date: 24     Allergies:   Bee venom  Restrictions/Precautions:   Post Surgical Precautions: R shoulder resurfacing      Interventions Planned (Treatment may consist of any combination of the following):     See Assessment Note    Subjective Comments:   Pt reports having some mild soreness post last session, but no pain this AM   Initial Pain Level::     0/10  Post Session Pain Level:       0/10  Medications Last Reviewed:  2024  Updated Objective Findings:   Flex w wand 143  Treatment   THERAPEUTIC EXERCISE: (40 minutes):    Exercises per grid below to improve mobility, strength, balance, and coordination.  Required minimal verbal and tactile cues to promote proper body alignment, promote proper body posture, and promote proper body mechanics.  Progressed resistance, range, repetitions, and complexity of movement as indicated.

## 2024-08-26 ENCOUNTER — HOSPITAL ENCOUNTER (OUTPATIENT)
Dept: PHYSICAL THERAPY | Age: 59
Setting detail: RECURRING SERIES
Discharge: HOME | End: 2024-08-29
Payer: COMMERCIAL

## 2024-08-26 PROCEDURE — 97110 THERAPEUTIC EXERCISES: CPT

## 2024-08-26 ASSESSMENT — PAIN SCALES - GENERAL: PAINLEVEL_OUTOF10: 0

## 2024-08-26 NOTE — PROGRESS NOTES
\David Marie  : 1965  Primary: [employer] [facility] [employer] [facility] ([employer])  Secondary[facility]  [facility]  [address]  [address]  [facility] [address]  Phone: [phone]  Fax: [phone] Plan Frequency: 1-2x/wk for 6 weeks    Plan of Care/Certification Expiration Date: 24        Plan of Care/Certification Expiration Date:  Plan of Care/Certification Expiration Date: 24    Frequency/Duration:   Plan Frequency: 1-2x/wk for 6 weeks      Time In/Out:   Time In: 730  Time Out: 815      PT Visit Info:    Total # of Visits to Date: 7      Visit Count:  7    OUTPATIENT PHYSICAL THERAPY:   Treatment Note 2024       Episode  (R shoulder resurfacing)               Treatment Diagnosis:    Stiffness of right shoulder, not elsewhere classified  Right shoulder pain, unspecified chronicity  Medical/Referring Diagnosis:    Pain in right shoulder [M25.511]  Other chronic pain [G89.29]      Referring Physician:  Josephine Perdomo APRN - NP  MD Orders:  PT Eval and Treat, 1-2x/wk for 4-6 weeks  Return MD Appt:  24   Date of Onset:  Onset Date: 24     Allergies:   Bee venom  Restrictions/Precautions:   Post Surgical Precautions: R shoulder resurfacing      Interventions Planned (Treatment may consist of any combination of the following):     See Assessment Note    Subjective Comments:   Pt reports no pain. Had a good weekend.   Initial Pain Level::     0/10  Post Session Pain Level:       0/10  Medications Last Reviewed:  2024  Updated Objective Findings:   Flex w wand 143  Treatment   THERAPEUTIC EXERCISE: (40 minutes):    Exercises per grid below to improve mobility, strength, balance, and coordination.  Required minimal verbal and tactile cues to promote proper body alignment, promote proper body posture, and promote proper body mechanics.  Progressed resistance, range, repetitions, and complexity of movement as indicated.   Date:  24 Date:  24 Date:  24

## 2024-08-28 ENCOUNTER — HOSPITAL ENCOUNTER (OUTPATIENT)
Dept: PHYSICAL THERAPY | Age: 59
Setting detail: RECURRING SERIES
Discharge: HOME | End: 2024-08-31
Payer: COMMERCIAL

## 2024-08-28 PROCEDURE — 97110 THERAPEUTIC EXERCISES: CPT

## 2024-08-28 ASSESSMENT — PAIN SCALES - GENERAL: PAINLEVEL_OUTOF10: 0

## 2024-08-28 NOTE — PROGRESS NOTES
\David Marie  : 1965  Primary: [employer] [facility] [employer] [facility] ([employer])  [facility]  [facility]  [address]  [address]  [facility] [address]  Phone: [phone]  Fax: [phone] Plan Frequency: 1-2x/wk for 6 weeks    Plan of Care/Certification Expiration Date: 24        Plan of Care/Certification Expiration Date:  Plan of Care/Certification Expiration Date: 24    Frequency/Duration:   Plan Frequency: 1-2x/wk for 6 weeks      Time In/Out:   Time In: 730  Time Out: 815      PT Visit Info:    Total # of Visits to Date: 8      Visit Count:  8    OUTPATIENT PHYSICAL THERAPY:   Treatment Note 2024       Episode  (R shoulder resurfacing)               Treatment Diagnosis:    Stiffness of right shoulder, not elsewhere classified  Right shoulder pain, unspecified chronicity  Medical/Referring Diagnosis:    Pain in right shoulder [M25.511]  Other chronic pain [G89.29]      Referring Physician:  Josephine Perdomo APRN - NP  MD Orders:  PT Eval and Treat, 1-2x/wk for 4-6 weeks  Return MD Appt:  24   Date of Onset:  Onset Date: 24     Allergies:   Bee venom  Restrictions/Precautions:   Post Surgical Precautions: R shoulder resurfacing      Interventions Planned (Treatment may consist of any combination of the following):     See Assessment Note    Subjective Comments:   Pt reports no pain even with increases last session.   Initial Pain Level::     0/10  Post Session Pain Level:       0/10  Medications Last Reviewed:  2024  Updated Objective Findings:   Flex w wand 143  Treatment   THERAPEUTIC EXERCISE: (40 minutes):    Exercises per grid below to improve mobility, strength, balance, and coordination.  Required minimal verbal and tactile cues to promote proper body alignment, promote proper body posture, and promote proper body mechanics.  Progressed resistance, range, repetitions, and complexity of movement as indicated.   Date:  24 Date:  24

## 2024-09-03 ENCOUNTER — HOSPITAL ENCOUNTER (OUTPATIENT)
Dept: PHYSICAL THERAPY | Age: 59
Setting detail: RECURRING SERIES
Discharge: HOME | End: 2024-09-06
Payer: COMMERCIAL

## 2024-09-03 PROCEDURE — 97110 THERAPEUTIC EXERCISES: CPT

## 2024-09-03 ASSESSMENT — PAIN SCALES - GENERAL: PAINLEVEL_OUTOF10: 0

## 2024-09-03 NOTE — PROGRESS NOTES
\David Marie  : 1965  Primary:  (Worker's Comp)  Secondary:  [facility]  [address]  [address]  [facility] [address]  Phone: [phone]  Fax: [phone] Plan Frequency: 1-2x/wk for 6 weeks    Plan of Care/Certification Expiration Date: 24        Plan of Care/Certification Expiration Date:  Plan of Care/Certification Expiration Date: 24    Frequency/Duration:   Plan Frequency: 1-2x/wk for 6 weeks      Time In/Out:   Time In: 730  Time Out: 815      PT Visit Info:    Total # of Visits to Date: 9      Visit Count:  9    OUTPATIENT PHYSICAL THERAPY:   Treatment Note 9/3/2024       Episode  (R shoulder resurfacing)               Treatment Diagnosis:    Stiffness of right shoulder, not elsewhere classified  Right shoulder pain, unspecified chronicity  Medical/Referring Diagnosis:    Pain in right shoulder [M25.511]  Other chronic pain [G89.29]      Referring Physician:  Josephine Perdomo APRN - NP  MD Orders:  PT Eval and Treat, 1-2x/wk for 4-6 weeks  Return MD Appt:  24   Date of Onset:  Onset Date: 24     Allergies:   Bee venom  Restrictions/Precautions:   Post Surgical Precautions: R shoulder resurfacing      Interventions Planned (Treatment may consist of any combination of the following):     See Assessment Note    Subjective Comments:   Had a good weekend. No pain.   Initial Pain Level::     0/10  Post Session Pain Level:       0/10  Medications Last Reviewed:  9/3/2024  Updated Objective Findings:   Flex w wand 143  Treatment   THERAPEUTIC EXERCISE: (40 minutes):    Exercises per grid below to improve mobility, strength, balance, and coordination.  Required minimal verbal and tactile cues to promote proper body alignment, promote proper body posture, and promote proper body mechanics.  Progressed resistance, range, repetitions, and complexity of movement as indicated.   Date:  24 Date:  24 Date:  24 Date:  24 Date:  24

## 2024-09-05 ENCOUNTER — HOSPITAL ENCOUNTER (OUTPATIENT)
Dept: PHYSICAL THERAPY | Age: 59
Setting detail: RECURRING SERIES
Discharge: HOME | End: 2024-09-08
Payer: COMMERCIAL

## 2024-09-05 PROCEDURE — 97110 THERAPEUTIC EXERCISES: CPT

## 2024-09-05 PROCEDURE — 97140 MANUAL THERAPY 1/> REGIONS: CPT

## 2024-09-05 ASSESSMENT — PAIN SCALES - GENERAL: PAINLEVEL_OUTOF10: 0

## 2024-09-10 ENCOUNTER — HOSPITAL ENCOUNTER (OUTPATIENT)
Dept: PHYSICAL THERAPY | Age: 59
Setting detail: RECURRING SERIES
Discharge: HOME | End: 2024-09-13
Payer: COMMERCIAL

## 2024-09-10 PROCEDURE — 97110 THERAPEUTIC EXERCISES: CPT

## 2024-09-10 PROCEDURE — 97140 MANUAL THERAPY 1/> REGIONS: CPT

## 2024-09-10 ASSESSMENT — PAIN SCALES - GENERAL: PAINLEVEL_OUTOF10: 0

## 2024-09-12 ENCOUNTER — HOSPITAL ENCOUNTER (OUTPATIENT)
Dept: PHYSICAL THERAPY | Age: 59
Setting detail: RECURRING SERIES
Discharge: HOME | End: 2024-09-15
Payer: COMMERCIAL

## 2024-09-12 PROCEDURE — 97140 MANUAL THERAPY 1/> REGIONS: CPT

## 2024-09-12 PROCEDURE — 97110 THERAPEUTIC EXERCISES: CPT

## 2024-09-12 ASSESSMENT — PAIN SCALES - GENERAL: PAINLEVEL_OUTOF10: 0

## 2024-09-19 ENCOUNTER — HOSPITAL ENCOUNTER (OUTPATIENT)
Dept: PHYSICAL THERAPY | Age: 59
Setting detail: RECURRING SERIES
Discharge: HOME | End: 2024-09-22
Payer: COMMERCIAL

## 2024-09-19 PROCEDURE — 97110 THERAPEUTIC EXERCISES: CPT

## 2024-09-19 ASSESSMENT — PAIN SCALES - GENERAL: PAINLEVEL_OUTOF10: 0

## 2024-09-24 ENCOUNTER — HOSPITAL ENCOUNTER (OUTPATIENT)
Dept: PHYSICAL THERAPY | Age: 59
Setting detail: RECURRING SERIES
Discharge: HOME | End: 2024-09-27
Payer: COMMERCIAL

## 2024-09-24 PROCEDURE — 97110 THERAPEUTIC EXERCISES: CPT

## 2024-09-24 PROCEDURE — 97140 MANUAL THERAPY 1/> REGIONS: CPT

## 2024-09-24 ASSESSMENT — PAIN SCALES - GENERAL: PAINLEVEL_OUTOF10: 0

## 2024-09-26 ENCOUNTER — HOSPITAL ENCOUNTER (OUTPATIENT)
Dept: PHYSICAL THERAPY | Age: 59
Setting detail: RECURRING SERIES
Discharge: HOME | End: 2024-09-29
Payer: COMMERCIAL

## 2024-09-26 PROCEDURE — 97140 MANUAL THERAPY 1/> REGIONS: CPT

## 2024-09-26 PROCEDURE — 97110 THERAPEUTIC EXERCISES: CPT

## 2024-09-26 ASSESSMENT — PAIN SCALES - GENERAL: PAINLEVEL_OUTOF10: 0

## 2024-09-26 NOTE — PROGRESS NOTES
\David Marie  : 1965  Primary: [facility] (Worker's Comp)  Secondary:  [facility]  [address]  [address]  [facility] [address]  Phone: [phone]  Fax: [phone] Plan Frequency: 1-2x/wk for 6 weeks    Plan of Care/Certification Expiration Date: 24        Plan of Care/Certification Expiration Date:  Plan of Care/Certification Expiration Date: 24    Frequency/Duration:   Plan Frequency: 1-2x/wk for 6 weeks      Time In/Out:   Time In: 815  Time Out: 905      PT Visit Info:    Total # of Visits to Date: 15      Visit Count:  15    OUTPATIENT PHYSICAL THERAPY:   Treatment Note 2024       Episode  (R shoulder resurfacing)               Treatment Diagnosis:    Stiffness of right shoulder, not elsewhere classified  Right shoulder pain, unspecified chronicity  Medical/Referring Diagnosis:    Pain in right shoulder [M25.511]  Other chronic pain [G89.29]      Referring Physician:  Josephine Perdomo APRN - NP  MD Orders:  PT Eval and Treat, 1-2x/wk for 4-6 weeks  Return MD Appt:  24   Date of Onset:  Onset Date: 24     Allergies:   Bee venom  Restrictions/Precautions:   Post Surgical Precautions: R shoulder resurfacing      Interventions Planned (Treatment may consist of any combination of the following):     See Assessment Note    Subjective Comments:   Feels good. No pain.   Initial Pain Level::     0/10  Post Session Pain Level:       0/10  Medications Last Reviewed:  2024  Updated Objective Findings:   Flex w wand 143  see progress note  Treatment   THERAPEUTIC EXERCISE: (30 minutes):    Exercises per grid below to improve mobility, strength, balance, and coordination.  Required minimal verbal and tactile cues to promote proper body alignment, promote proper body posture, and promote proper body mechanics.  Progressed resistance, range, repetitions, and complexity of movement as indicated.   Date:  24 Date:  9/10/24 Date:  24

## 2024-10-01 ENCOUNTER — HOSPITAL ENCOUNTER (OUTPATIENT)
Dept: PHYSICAL THERAPY | Age: 59
Setting detail: RECURRING SERIES
Discharge: HOME | End: 2024-10-04
Payer: COMMERCIAL

## 2024-10-01 PROCEDURE — 97110 THERAPEUTIC EXERCISES: CPT

## 2024-10-01 PROCEDURE — 97140 MANUAL THERAPY 1/> REGIONS: CPT

## 2024-10-01 ASSESSMENT — PAIN SCALES - GENERAL: PAINLEVEL_OUTOF10: 0

## 2024-10-01 NOTE — PROGRESS NOTES
Date:  9/5/24 Date:  9/10/24 Date:  9/12/24 Date:  9/19/24 Date:  9/24/24 Date:  9/26/24 Date:  10/1/24   Activity/Exercise Parameters Parameters Parameters Parameters Parameters Parameters Parameters   HEP          UBE 3/3 2.5 working strength and ROM 3/3 3.0 working strength and ROM 3/3 3.0 working strength and ROM 3/3 3.0 working strength and ROM 3/3 3.0 working strength and ROM 3/3 3.0 working ROM and strength  3/3 3.0 working ROM and strength    Pulley 15x flexion   15x abduction  15x flexion  15x abduction       Isometrics          Serratus punch 20x 4#  20x 5#    20x 5#  20x 5#    Stabilizations          Wand          Wall slides   20x flexion w/ lift off 20x flexion lift off  20x diagonal both directions      Rows 20x prone 20x prone 1#  20x prone 2# 20x prone 2# 20x prone 2# 20x prone 3#   Ms 20x prone  20x prone 1#  20x prone 2# 20x prone 2# 20x prone 2# 20x prone 3#   T 20x prone 20x prone 1#  20x prone 2# 20x prone 2# 20x prone 2# 20x prone 3#   Y        20x forward lean   AAROM  Wall ladder 5x flexion, 5x abduction        Shoulder flexion 20x supine 2#   20x sidelying 2#  20x supine 2#   20x sidelying 2#   Standing w/ back against wall 15x  10x in front of mirror 20x supine 2# 20x standing 2# 20x standing 2#   10x no weight standing into max range 20x supine 5#  Standing back against wall 20x  20x 3# supine supine  Against wall 10x   Shoulder abduction 20x sidelying 2# regular and horizontal 20x sidelying 2# regular and horizontal  10x in front of mirror 20x sidelying 2# horizontal and regular 20x 2# standing 20x 2# standing     Shrug     20x 5#     Shoulder ER 20x 2# sidelying 20x 2# sidelying  20x lime band standing 20x 2# sidelying 20x sidelying 2# 20x prone 20x prone 20x 3# sidelying  Around the head 3#    Shoulder IR    20x sidelying lime band 20x prone 20x prone 20x 3# sidelying  20x lift off behind back   Thoracic extension 5x in chair  10x in chair   10x prayer stretch 5x walk out from //

## 2024-10-03 ENCOUNTER — HOSPITAL ENCOUNTER (OUTPATIENT)
Dept: PHYSICAL THERAPY | Age: 59
Setting detail: RECURRING SERIES
Discharge: HOME | End: 2024-10-06
Payer: COMMERCIAL

## 2024-10-03 PROCEDURE — 97140 MANUAL THERAPY 1/> REGIONS: CPT

## 2024-10-03 PROCEDURE — 97110 THERAPEUTIC EXERCISES: CPT

## 2024-10-03 ASSESSMENT — PAIN SCALES - GENERAL
PAINLEVEL_OUTOF10: 0
PAINLEVEL_OUTOF10: 1

## 2024-10-03 NOTE — PROGRESS NOTES
standing 1#      Band double green all 4 diagonal directions (except diagonal lift 1 band)   Biceps 20x 7#          Triceps 20x 5#          Push ups   10x standing at wall  10x on mat     Arm balance   In quadruped arm lifts 10x each  10x in quadruped lifts      Pec stretch     10x in stand R 10x supine R    Functional work lifts       Cart push and pull 150# (5x)  Lift box to waist height 50# (5x)  Lift box to chin height 10# (1x sore)     MANUAL THERAPY: (10 minutes):   Joint mobilization and Soft tissue mobilization was utilized and necessary because of the patient's restricted joint motion, painful spasm, loss of articular motion, and restricted motion of soft tissue.   Date:  9/12/24 Date:  9/24/24 Date:  9/26/24 Date:  10/1/24 Date:  10/3/24    Parameters  parameters Parameters   Manual mobilizations PROM into all directions to tolerance Mobilizations to scapula in sidelying Mobilizations to R shoulder in supine Mobilizations to R shoulder in supine   STM to lat and upper trap w/ shoulder flexion ROM  STM to R upper trap  PROM in all directions at end ranges PROM in all directions at end ranges                     Treatment/Session Summary:    Treatment Assessment:   Pt had soreness for the first time ever since his surgery with shoulder flexion and overhead work. 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 He did well with all below chest activities. PT called MD for potential recheck and to inquire if an FCE was desired prior to return to work. 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   Communication/Consultation:  None today  Equipment provided today:  None  Recommendations/Intent for next treatment session: Next visit will focus on progression of functional

## 2024-10-08 ENCOUNTER — HOSPITAL ENCOUNTER (OUTPATIENT)
Dept: PHYSICAL THERAPY | Age: 59
Setting detail: RECURRING SERIES
Discharge: HOME | End: 2024-10-11
Payer: COMMERCIAL

## 2024-10-08 PROCEDURE — 97110 THERAPEUTIC EXERCISES: CPT

## 2024-10-08 PROCEDURE — 97140 MANUAL THERAPY 1/> REGIONS: CPT

## 2024-10-08 ASSESSMENT — PAIN SCALES - GENERAL: PAINLEVEL_OUTOF10: 9

## 2024-10-08 NOTE — PROGRESS NOTES
\David Marie  : 1965  Primary: [facility] (Worker's [employer])  Secondary:  [facility]  [address]  [address]  [facility] [address]  Phone: [phone]  Fax: [phone] Plan Frequency: 1-2x/wk for 6 weeks    Plan of Care/Certification Expiration Date: 24        Plan of Care/Certification Expiration Date:  Plan of Care/Certification Expiration Date: 24    Frequency/Duration:   Plan Frequency: 1-2x/wk for 6 weeks      Time In/Out:   Time In: 730  Time Out: 819      PT Visit Info:    Total # of Visits to Date: 18      Visit Count:  18    OUTPATIENT PHYSICAL THERAPY:   Treatment Note 10/8/2024       Episode  (R shoulder resurfacing)               Treatment Diagnosis:    Stiffness of right shoulder, not elsewhere classified  Right shoulder pain, unspecified chronicity  Medical/Referring Diagnosis:    Pain in right shoulder [M25.511]  Other chronic pain [G89.29]      Referring Physician:  Josephine Perdomo APRN - NP  MD Orders:  PT Eval and Treat, 1-2x/wk for 4-6 weeks  Return MD Appt:  24   Date of Onset:  Onset Date: 24     Allergies:   Bee venom  Restrictions/Precautions:   Post Surgical Precautions: R shoulder resurfacing      Interventions Planned (Treatment may consist of any combination of the following):     See Assessment Note    Subjective Comments:   Pt reports no change in pain. Still very sore with overhead lifting. Also notes he tried to do the pulley and can't get his arm overhead anymore. Pt reports he sees his MD at 1:00 today.   Initial Pain Level::     9/10  Post Session Pain Level:       6/10  Medications Last Reviewed:  10/8/2024  Updated Objective Findings:   Flex w wand 143  see progress note  Treatment   THERAPEUTIC EXERCISE: (15 minutes):    Exercises per grid below to improve mobility, strength, balance, and coordination.  Required minimal verbal and tactile cues to promote proper body alignment, promote proper body posture,

## 2024-10-10 ENCOUNTER — HOSPITAL ENCOUNTER (OUTPATIENT)
Dept: PHYSICAL THERAPY | Age: 59
Setting detail: RECURRING SERIES
Discharge: HOME | End: 2024-10-13
Payer: COMMERCIAL

## 2024-10-10 PROCEDURE — 97110 THERAPEUTIC EXERCISES: CPT

## 2024-10-10 PROCEDURE — 97140 MANUAL THERAPY 1/> REGIONS: CPT

## 2024-10-10 ASSESSMENT — PAIN SCALES - GENERAL: PAINLEVEL_OUTOF10: 4

## 2024-10-10 NOTE — PROGRESS NOTES
\David Marie  : 1965  Primary: [facility] (Worker's [employer])  [facility]  [facility]  [address]  [address]  [facility] [address]  Phone: [phone]  Fax: [phone] Plan Frequency: 1-2x/wk for 6 weeks    Plan of Care/Certification Expiration Date: 24        Plan of Care/Certification Expiration Date:  Plan of Care/Certification Expiration Date: 24    Frequency/Duration:   Plan Frequency: 1-2x/wk for 6 weeks      Time In/Out:   Time In: 811  Time Out: 905      PT Visit Info:    Total # of Visits to Date: 19      Visit Count:  19    OUTPATIENT PHYSICAL THERAPY:   Treatment Note 10/10/2024       Episode  (R shoulder resurfacing)               Treatment Diagnosis:    Stiffness of right shoulder, not elsewhere classified  Right shoulder pain, unspecified chronicity  Medical/Referring Diagnosis:    Pain in right shoulder [M25.511]  Other chronic pain [G89.29]      Referring Physician:  Josephine Perdomo APRN - NP  MD Orders:  PT Eval and Treat, 1-2x/wk for 4-6 weeks  Return MD Appt:  24   Date of Onset:  Onset Date: 24     Allergies:   Bee venom  Restrictions/Precautions:   Post Surgical Precautions: R shoulder resurfacing      Interventions Planned (Treatment may consist of any combination of the following):     See Assessment Note    Subjective Comments:   Pt reports MD said he thinks it is inflammation and gave him an injection. Thinks in a few weeks he will feel better.   Initial Pain Level::     4/10  Post Session Pain Level:       3/10  Medications Last Reviewed:  10/10/2024  Updated Objective Findings:   Flex w wand 143  see progress note  Treatment   THERAPEUTIC EXERCISE: (25 minutes):    Exercises per grid below to improve mobility, strength, balance, and coordination.  Required minimal verbal and tactile cues to promote proper body alignment, promote proper body posture, and promote proper body mechanics.  Progressed resistance, range,

## 2024-10-15 ENCOUNTER — HOSPITAL ENCOUNTER (OUTPATIENT)
Dept: PHYSICAL THERAPY | Age: 59
Setting detail: RECURRING SERIES
Discharge: HOME | End: 2024-10-18
Payer: COMMERCIAL

## 2024-10-15 PROCEDURE — 97110 THERAPEUTIC EXERCISES: CPT

## 2024-10-15 PROCEDURE — 97140 MANUAL THERAPY 1/> REGIONS: CPT

## 2024-10-15 ASSESSMENT — PAIN SCALES - GENERAL: PAINLEVEL_OUTOF10: 2

## 2024-10-15 NOTE — PROGRESS NOTES
\David Marie  : 1965  Primary: [facility] (Worker's [employer])  [facility]  [facility]  [address]  [address]  [facility] [address]  Phone: [phone]  Fax: [phone] Plan Frequency: 1-2x/wk for 6 weeks    Plan of Care/Certification Expiration Date: 24        Plan of Care/Certification Expiration Date:  Plan of Care/Certification Expiration Date: 24    Frequency/Duration:   Plan Frequency: 1-2x/wk for 6 weeks      Time In/Out:   Time In: 730  Time Out: 815      PT Visit Info:    Total # of Visits to Date: 20      Visit Count:  20    OUTPATIENT PHYSICAL THERAPY:   Treatment Note 10/15/2024       Episode  (R shoulder resurfacing)               Treatment Diagnosis:    Stiffness of right shoulder, not elsewhere classified  Right shoulder pain, unspecified chronicity  Medical/Referring Diagnosis:    Pain in right shoulder [M25.511]  Other chronic pain [G89.29]      Referring Physician:  Josephine Perdomo APRN - NP  MD Orders:  PT Eval and Treat, 1-2x/wk for 4-6 weeks  Return MD Appt:  24   Date of Onset:  Onset Date: 24     Allergies:   Bee venom  Restrictions/Precautions:   Post Surgical Precautions: R shoulder resurfacing      Interventions Planned (Treatment may consist of any combination of the following):     See Assessment Note    Subjective Comments:   Pt reports medicine is helping some with his pain. Was sore after Thursday's session.   Initial Pain Level::     2/10  Post Session Pain Level:       0/10  Medications Last Reviewed:  10/15/2024  Updated Objective Findings:   Flex w wand 143  see progress note  Treatment   THERAPEUTIC EXERCISE: (25 minutes):    Exercises per grid below to improve mobility, strength, balance, and coordination.  Required minimal verbal and tactile cues to promote proper body alignment, promote proper body posture, and promote proper body mechanics.  Progressed resistance, range, repetitions, and complexity of

## 2024-10-17 ENCOUNTER — HOSPITAL ENCOUNTER (OUTPATIENT)
Dept: PHYSICAL THERAPY | Age: 59
Setting detail: RECURRING SERIES
Discharge: HOME | End: 2024-10-20
Payer: COMMERCIAL

## 2024-10-17 PROCEDURE — 97110 THERAPEUTIC EXERCISES: CPT

## 2024-10-17 ASSESSMENT — PAIN SCALES - GENERAL: PAINLEVEL_OUTOF10: 0

## 2024-10-17 NOTE — PROGRESS NOTES
\David Marie  : 1965  Primary: [facility] (Worker's [employer])  [facility]:  [facility]  [address]  [address]  [facility] [address]  Phone: [phone]  Fax: [phone] Plan Frequency: 1-2x/wk for 6 weeks    Plan of Care/Certification Expiration Date: 24        Plan of Care/Certification Expiration Date:  Plan of Care/Certification Expiration Date: 24    Frequency/Duration:   Plan Frequency: 1-2x/wk for 6 weeks      Time In/Out:   Time In: 815  Time Out: 902      PT Visit Info:    Total # of Visits to Date: 21      Visit Count:  21    OUTPATIENT PHYSICAL THERAPY:   Treatment Note 10/17/2024       Episode  (R shoulder resurfacing)               Treatment Diagnosis:    Stiffness of right shoulder, not elsewhere classified  Right shoulder pain, unspecified chronicity  Medical/Referring Diagnosis:    Pain in right shoulder [M25.511]  Other chronic pain [G89.29]      Referring Physician:  Josephine Perdomo APRN - NP  MD Orders:  PT Eval and Treat, 1-2x/wk for 4-6 weeks  Return MD Appt:  24   Date of Onset:  Onset Date: 24     Allergies:   Bee venom  Restrictions/Precautions:   Post Surgical Precautions: R shoulder resurfacing      Interventions Planned (Treatment may consist of any combination of the following):     See Assessment Note    Subjective Comments:   Pt reports pain is better. Still pain with abduction but overall improving with oral steroid.   Initial Pain Level::     0/10  Post Session Pain Level:       0/10  Medications Last Reviewed:  10/17/2024  Updated Objective Findings:   Flex w wand 143  see progress note  Treatment   THERAPEUTIC EXERCISE: (40 minutes):    Exercises per grid below to improve mobility, strength, balance, and coordination.  Required minimal verbal and tactile cues to promote proper body alignment, promote proper body posture, and promote proper body mechanics.  Progressed resistance, range, repetitions, and complexity 
pain and decreased functional impairments. MET  2. Pt will have R shoulder ER ROM of 65* or more w/ pain 1/10 or less in order to improve functional abilities. MET  3. Pt will have R shoulder IR ROM of 65 *or more w/ pain 1/10 or less in order to improve functional abilities. MET  4. 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 Progressing   5. Pt will be able to lift 50-75# box in order to return to work related duties. Progressing         Medical Necessity:   > Patient is expected to demonstrate progress in strength, range of motion, balance, and coordination to increase independence with functional tasks.  Reason For Services/Other Comments:  > Patient continues to demonstrate capacity to improve strength, ROM, balance, mobility which will increase independence.      Regarding David Fontenotstacy's therapy, I certify that the treatment plan above will be carried out by a therapist or under their direction.  Thank you for this referral,  Josephine Rosas, PT     Referring Physician Signature: Josephine Perdomo* No Signature is Required for this note.        Charge Capture  Events  Appt Desk  Attendance Report

## 2024-10-22 ENCOUNTER — HOSPITAL ENCOUNTER (OUTPATIENT)
Dept: PHYSICAL THERAPY | Age: 59
Setting detail: RECURRING SERIES
Discharge: HOME | End: 2024-10-25
Payer: COMMERCIAL

## 2024-10-22 PROCEDURE — 97110 THERAPEUTIC EXERCISES: CPT

## 2024-10-22 ASSESSMENT — PAIN SCALES - GENERAL: PAINLEVEL_OUTOF10: 0

## 2024-10-22 NOTE — PROGRESS NOTES
\David Marie  : 1965  Primary: [facility] (Worker's [employer])  [facility]  [address]  [address]  [facility] [address]  Phone: [phone]  Fax: [phone] Plan Frequency: 1-2x/wk for 6 weeks    Plan of Care/Certification Expiration Date: 24        Plan of Care/Certification Expiration Date:  Plan of Care/Certification Expiration Date: 24    Frequency/Duration:   Plan Frequency: 1-2x/wk for 6 weeks      Time In/Out:   Time In: 735  Time Out: 825      PT Visit Info:    Total # of Visits to Date:       Visit Count:  22    OUTPATIENT PHYSICAL THERAPY:   Treatment Note 10/22/2024       Episode  (R shoulder resurfacing)               Treatment Diagnosis:    Stiffness of right shoulder, not elsewhere classified  Right shoulder pain, unspecified chronicity  Medical/Referring Diagnosis:    Pain in right shoulder [M25.511]  Other chronic pain [G89.29]      Referring Physician:  Josephine Perdomo APRN - NP  MD Orders:  PT Eval and Treat, 1-2x/wk for 4-6 weeks  Return MD Appt:  24   Date of Onset:  Onset Date: 24     Allergies:   Bee venom  Restrictions/Precautions:   Post Surgical Precautions: R shoulder resurfacing      Interventions Planned (Treatment may consist of any combination of the following):     See Assessment Note    Subjective Comments:   Pt reports pain and motion are better. Still on his steroid and thinks he still has a few weeks of it.  Initial Pain Level::     0/10  Post Session Pain Level:       0/10  Medications Last Reviewed:  10/22/2024  Updated Objective Findings:   Flex w wand 143  see progress note  Treatment   THERAPEUTIC EXERCISE: (40 minutes):    Exercises per grid below to improve mobility, strength, balance, and coordination.  Required minimal verbal and tactile cues to promote proper body alignment, promote proper body posture, and promote proper body mechanics.  Progressed resistance, range, repetitions, and

## 2024-10-24 ENCOUNTER — HOSPITAL ENCOUNTER (OUTPATIENT)
Dept: PHYSICAL THERAPY | Age: 59
Setting detail: RECURRING SERIES
Discharge: HOME | End: 2024-10-27
Payer: COMMERCIAL

## 2024-10-24 PROCEDURE — 97110 THERAPEUTIC EXERCISES: CPT

## 2024-10-24 PROCEDURE — 97140 MANUAL THERAPY 1/> REGIONS: CPT

## 2024-10-24 ASSESSMENT — PAIN SCALES - GENERAL: PAINLEVEL_OUTOF10: 0

## 2024-10-24 NOTE — PROGRESS NOTES
\David Marie  : 1965  Primary: [facility] (Worker's [employer])  Secondary:  [facility]  [address]  [address]  [facility] [address]  Phone: [phone]  Fax: [phone] Plan Frequency: 1-2x/wk for 6 weeks    Plan of Care/Certification Expiration Date: 24        Plan of Care/Certification Expiration Date:  Plan of Care/Certification Expiration Date: 24    Frequency/Duration:   Plan Frequency: 1-2x/wk for 6 weeks      Time In/Out:   Time In: 730  Time Out: 815      PT Visit Info:    Total # of Visits to Date: 23      Visit Count:  23    OUTPATIENT PHYSICAL THERAPY:   Treatment Note 10/24/2024       Episode  (R shoulder resurfacing)               Treatment Diagnosis:    Stiffness of right shoulder, not elsewhere classified  Right shoulder pain, unspecified chronicity  Medical/Referring Diagnosis:    Pain in right shoulder [M25.511]  Other chronic pain [G89.29]      Referring Physician:  Josephine Perdomo APRN - NP  MD Orders:  PT Eval and Treat, 1-2x/wk for 4-6 weeks  Return MD Appt:  24   Date of Onset:  Onset Date: 24     Allergies:   Bee venom  Restrictions/Precautions:   Post Surgical Precautions: R shoulder resurfacing      Interventions Planned (Treatment may consist of any combination of the following):     See Assessment Note    Subjective Comments:   Pt reports improvement in symptoms. Still pinching though.    Initial Pain Level::     0/10  Post Session Pain Level:       0/10  Medications Last Reviewed:  10/24/2024  Updated Objective Findings:   Flex w wand 143  see progress note  Treatment   THERAPEUTIC EXERCISE: (30 minutes):    Exercises per grid below to improve mobility, strength, balance, and coordination.  Required minimal verbal and tactile cues to promote proper body alignment, promote proper body posture, and promote proper body mechanics.  Progressed resistance, range, repetitions, and complexity of movement as indicated.

## 2024-10-29 ENCOUNTER — HOSPITAL ENCOUNTER (OUTPATIENT)
Dept: PHYSICAL THERAPY | Age: 59
Setting detail: RECURRING SERIES
Discharge: HOME | End: 2024-11-01
Payer: COMMERCIAL

## 2024-10-29 PROCEDURE — 97110 THERAPEUTIC EXERCISES: CPT

## 2024-10-29 PROCEDURE — 97140 MANUAL THERAPY 1/> REGIONS: CPT

## 2024-10-29 ASSESSMENT — PAIN SCALES - GENERAL: PAINLEVEL_OUTOF10: 1

## 2024-10-29 NOTE — THERAPY RECERTIFICATION
David Marie  : 1965  Primary: [facility] (Worker's [employer])  Secondary:  [facility]  [address]  [address]  [facility] [address]  Phone: [phone]  Fax: [phone] Plan Frequency: 1-2x/wk for 30 more days    Plan of Care/Certification Expiration Date: 24        Plan of Care/Certification Expiration Date:  Plan of Care/Certification Expiration Date: 24    Frequency/Duration:   Plan Frequency: 1-2x/wk for 30 more days      Time In/Out:   Time In: 730  Time Out: 817      PT Visit Info:    Total # of Visits to Date: 24      Visit Count:  24                OUTPATIENT PHYSICAL THERAPY:             Recertification 10/29/2024               Episode (R shoulder resurfacing)         Treatment Diagnosis:     Stiffness of right shoulder, not elsewhere classified  Right shoulder pain, unspecified chronicity  Medical/Referring Diagnosis:    Pain in right shoulder [M25.511]  Other chronic pain [G89.29]      Referring Physician:  Josephine Perdomo APRN - NP  MD Orders:  PT Eval and Treat, 1-2x/wk for 4-6 weeks  Return MD Appt:  24  Date of Onset:  Onset Date: 24  Most recent surgery, also has past surgery on 10/27/23 which did not recovery properly and therefore caused him to have this surgery  Allergies:  Bee venom  Restrictions/Precautions:    Post Surgical Precautions: shoulder resurfacing      Medications Last Reviewed:  10/29/2024     SUBJECTIVE   History of Injury/Illness (Reason for Referral):  Pt had an injury at work that resulted in R shoulder surgery on 10/27/23. He went to [facility] and did not recover well. MD decided to do a shoulder resurfacing to help. That surgery was performed on 24. It is of note that between the shoulder surgeries he also had knee surgery for meniscal issues.   Patient Stated Goal(s):  \"get back to work, improve strength and ROM\"  Initial Pain Level:      1/10   Post Session Pain Level:     0/10  Past Medical

## 2024-10-29 NOTE — PROGRESS NOTES
supine At wall 15x isometric 10x BTB walk outs  20x BTB AROM 2x10 lime band supine  20x RTB 20x BTB   Shoulder IR  Isometrics 20x in supine At wall 15x isometric 10x BTB walk outs  20x BTB AROM 6# ball around the world 20x BTB 20x BTB   Thoracic extension       10x at a//   Diagonal/PNF     2x10 lime band supine     Biceps     20x lime band     Triceps          Push ups     Chest press 10x lime band supine Supine chest press 20x    Arm balance          Pec stretch          Functional work lifts          Stretching to joint w/ band  1x flexion, abduction, extension, IR in stand          MANUAL THERAPY: (10 minutes):   Joint mobilization and Soft tissue mobilization was utilized and necessary because of the patient's restricted joint motion, painful spasm, loss of articular motion, and restricted motion of soft tissue.   Date:  10/8/24 Date:  10/10/24 Date:  10/15/24 Date:  10/24/24 Date:  10/29/24   Parameters Parameters Parameters Parameters Parameters   Mobilizations to R shoulder in supine: long axis, posterior, inferior Mobilizations to R shoulder in supine: long axis, posterior, inferior Mobilizations to R shoulder in supine: long axis, posterior, inferior Mobilizations to R shoulder in supine: long axis, posterior, inferior Mobilizations to R shoulder in supine: long axis, posterior, inferior   PROM in all directions to tolerance PROM in all directions to tolerance PROM in all directions to tolerance STM to R deltoid and R pec STM to R lat w/ flexion AROM   STM to R pec, deltoid, upper trap in supine STM to R upper trap and levator in sit STM to R deltoid       KT tape to R shoulder for positioning and support          Treatment/Session Summary:    Treatment Assessment:   Pt had improved flexion w/ lat release and thoracic extension. Overall he is doing well but still needs strengthening help prior to return to work Extending POC today.   Communication/Consultation:  None today  Equipment provided today:

## 2024-11-05 ENCOUNTER — HOSPITAL ENCOUNTER (OUTPATIENT)
Dept: PHYSICAL THERAPY | Age: 59
Setting detail: RECURRING SERIES
Discharge: HOME | End: 2024-11-08
Payer: COMMERCIAL

## 2024-11-05 PROCEDURE — 97110 THERAPEUTIC EXERCISES: CPT

## 2024-11-05 PROCEDURE — 97140 MANUAL THERAPY 1/> REGIONS: CPT

## 2024-11-05 ASSESSMENT — PAIN SCALES - GENERAL: PAINLEVEL_OUTOF10: 2

## 2024-11-05 NOTE — PROGRESS NOTES
stand  10x supine 10x standing   Shoulder abduction  At wall 15x isometric 15x BTB isometric walk out  Also 10x resisted UE ranger with red band See above 20x red band w/ UE ranger 20x RTB in stand 10x standing   Shrug          Shoulder ER Isometrics 20x in supine At wall 15x isometric 10x BTB walk outs  20x BTB AROM 2x10 lime band supine  20x RTB 20x BTB 20x 3# sidelying   Shoulder IR Isometrics 20x in supine At wall 15x isometric 10x BTB walk outs  20x BTB AROM 6# ball around the world 20x BTB 20x BTB    Thoracic extension      10x at a//    Diagonal/PNF    2x10 lime band supine      Biceps    20x lime band      Triceps          Push ups    Chest press 10x lime band supine Supine chest press 20x     Arm balance          Pec stretch          Functional work lifts          Stretching to joint w/ band 1x flexion, abduction, extension, IR in stand           MANUAL THERAPY: (10 minutes):   Joint mobilization and Soft tissue mobilization was utilized and necessary because of the patient's restricted joint motion, painful spasm, loss of articular motion, and restricted motion of soft tissue.   Date:  10/10/24 Date:  10/15/24 Date:  10/24/24 Date:  10/29/24 Date:  11/5/24   Parameters Parameters Parameters Parameters Parameters   Mobilizations to R shoulder in supine: long axis, posterior, inferior Mobilizations to R shoulder in supine: long axis, posterior, inferior Mobilizations to R shoulder in supine: long axis, posterior, inferior Mobilizations to R shoulder in supine: long axis, posterior, inferior Mobilizations to R shoulder in supine: long axis, posterior, inferior   PROM in all directions to tolerance PROM in all directions to tolerance STM to R deltoid and R pec STM to R lat w/ flexion AROM PROM in all directions in supine   STM to R upper trap and levator in sit STM to R deltoid    Cross body stretch and delt stretch in stand by PT   KT tape to R shoulder for positioning and support         FUNCTIONAL DRY

## 2024-11-07 ENCOUNTER — HOSPITAL ENCOUNTER (OUTPATIENT)
Dept: PHYSICAL THERAPY | Age: 59
Setting detail: RECURRING SERIES
Discharge: HOME | End: 2024-11-10
Payer: COMMERCIAL

## 2024-11-07 PROCEDURE — 97110 THERAPEUTIC EXERCISES: CPT

## 2024-11-07 ASSESSMENT — PAIN SCALES - GENERAL: PAINLEVEL_OUTOF10: 0

## 2024-11-07 NOTE — PROGRESS NOTES
instrument-assisted soft tissue mobilization was performed to:  Muscle(s): right upper trapezius and deltoid  Needle(s) size used: .30 x 50mm  Technique(s): use of pistoning techniques  For the purpose of: trigger point release , increase blood flow, local twitch response that leads to alternation in length and tension of muscle fibers, and neurophysiology effect, resulting in reduction of central and peripheral sensitization to pain    The patient tolerated the treatment with a positive treatment effect and no complications noted. Clinical outcome of the treatment included needling treatment outcome: decrease in pain levels, improved ROM, and improved muscle tone and play.    Dry Needles Used: 3   Dry Needles Removed: 3     Patient has been educated with post-treatment care including hydration and mobility.     Treatment/Session Summary:    Treatment Assessment:   Pt progressed w/ strengthening and had improved ROM and pain. His shoulder was rising up with flexion and abduction but after cues and strengthening he was able to keep it down and had improved motion and pain.  Communication/Consultation:  None today  Equipment provided today:  None  Recommendations/Intent for next treatment session: Next visit will focus on progression of functional tasks as tolerated.    >Total Treatment Billable Duration: 40 mins therex   Time In: 1030  Time Out: 1115    Josephine Rosas PT         Charge Capture  Events  [employer] Portal  Appt Desk  Attendance Report     Future Appointments   Date Time Provider Department Center   11/12/2024  7:30 AM Josephine Rosas PT [facility]   11/14/2024  7:30 AM Josephine Rosas PT [facility]   11/19/2024  7:30 AM Josephine Rosas PT [facility]   11/21/2024  7:30 AM Josephine Rossa PT [facility]   11/26/2024  7:30 AM Josephine Rosas PT [facility]   12/3/2024  7:30 AM Josephine Rosas PT [facility]   12/5/2024  7:30 AM Josephine Rosas PT [facility]   12/10/2024  7:30 AM

## 2024-11-12 ENCOUNTER — HOSPITAL ENCOUNTER (OUTPATIENT)
Dept: PHYSICAL THERAPY | Age: 59
Setting detail: RECURRING SERIES
Discharge: HOME | End: 2024-11-15
Payer: COMMERCIAL

## 2024-11-12 PROCEDURE — 97110 THERAPEUTIC EXERCISES: CPT

## 2024-11-12 ASSESSMENT — PAIN SCALES - GENERAL: PAINLEVEL_OUTOF10: 0

## 2024-11-12 NOTE — PROGRESS NOTES
\David Marie  : 1965  Primary: [facility] (Worker's [employer])  Secondary:  [facility]  [address]  [address]  [facility] [address]  Phone: [phone]  Fax: [phone] Plan Frequency: 1-2x/wk for 30 more days      Plan of Care/Certification Expiration Date: 24          Plan of Care/Certification Expiration Date:  Plan of Care/Certification Expiration Date: 24      Frequency/Duration:   Plan Frequency: 1-2x/wk for 30 more days        Time In/Out:   Time In: 730  Time Out: 818      PT Visit Info:    Total # of Visits to Date: 27      Visit Count:  27    OUTPATIENT PHYSICAL THERAPY:   Treatment Note 2024       Episode  (R shoulder resurfacing)               Treatment Diagnosis:    Stiffness of right shoulder, not elsewhere classified  Right shoulder pain, unspecified chronicity  Medical/Referring Diagnosis:    Pain in right shoulder [M25.511]  Other chronic pain [G89.29]      Referring Physician:  Josephine Perdomo APRN - NP  MD Orders:  PT Eval and Treat, 1-2x/wk for 4-6 weeks  Return MD Appt:  24   Date of Onset:  Onset Date: 24     Allergies:   Bee venom  Restrictions/Precautions:   Post Surgical Precautions: R shoulder resurfacing      Interventions Planned (Treatment may consist of any combination of the following):     See Assessment Note    Subjective Comments:   Pt reports he feels better. Has maintained since last session the relief and improved ROM.  Initial Pain Level::     0/10  Post Session Pain Level:       0/10  Medications Last Reviewed:  2024  Updated Objective Findings:   Flex 170*  see progress note  Treatment   THERAPEUTIC EXERCISE: (40 minutes):    Exercises per grid below to improve mobility, strength, balance, and coordination.  Required minimal verbal and tactile cues to promote proper body alignment, promote proper body posture, and promote proper body mechanics.  Progressed resistance, range, repetitions, and

## 2024-11-14 ENCOUNTER — HOSPITAL ENCOUNTER (OUTPATIENT)
Dept: PHYSICAL THERAPY | Age: 59
Setting detail: RECURRING SERIES
Discharge: HOME | End: 2024-11-17
Payer: COMMERCIAL

## 2024-11-14 PROCEDURE — 97110 THERAPEUTIC EXERCISES: CPT

## 2024-11-14 ASSESSMENT — PAIN SCALES - GENERAL: PAINLEVEL_OUTOF10: 0

## 2024-11-14 NOTE — PROGRESS NOTES
\David Marie  : 1965  Primary: [facility] (Worker's [employer])  [facility]  [facility]  [address]  [address]  [facility] [address]  Phone: [phone]  Fax: [phone] Plan Frequency: 1-2x/wk for 30 more days      Plan of Care/Certification Expiration Date: 24          Plan of Care/Certification Expiration Date:  Plan of Care/Certification Expiration Date: 24      Frequency/Duration:   Plan Frequency: 1-2x/wk for 30 more days        Time In/Out:   Time In: 0730  Time Out: 0815      PT Visit Info:    Total # of Visits to Date: 28      Visit Count:  28    OUTPATIENT PHYSICAL THERAPY:   Treatment Note 2024       Episode  (R shoulder resurfacing)               Treatment Diagnosis:    Stiffness of right shoulder, not elsewhere classified  Right shoulder pain, unspecified chronicity  Medical/Referring Diagnosis:    Pain in right shoulder [M25.511]  Other chronic pain [G89.29]      Referring Physician:  Josephine Perdomo APRN - NP MD Orders:  PT Eval and Treat, 1-2x/wk for 4-6 weeks  Return MD Appt:  24   Date of Onset:  Onset Date: 24     Allergies:   Bee venom  Restrictions/Precautions:   Post Surgical Precautions: R shoulder resurfacing      Interventions Planned (Treatment may consist of any combination of the following):     See Assessment Note    Subjective Comments:   Pt reports he feels fine. Had some muscle soreness last night from yard work.  Initial Pain Level::     0/10  Post Session Pain Level:       0/10  Medications Last Reviewed:  2024  Updated Objective Findings:   Flex 170*  see progress note  Treatment   THERAPEUTIC EXERCISE: (40 minutes):    Exercises per grid below to improve mobility, strength, balance, and coordination.  Required minimal verbal and tactile cues to promote proper body alignment, promote proper body posture, and promote proper body mechanics.  Progressed resistance, range, repetitions, and complexity of

## 2024-11-19 ENCOUNTER — APPOINTMENT (OUTPATIENT)
Dept: PHYSICAL THERAPY | Age: 59
End: 2024-11-19
Payer: COMMERCIAL

## 2024-11-21 ENCOUNTER — APPOINTMENT (OUTPATIENT)
Dept: PHYSICAL THERAPY | Age: 59
End: 2024-11-21
Payer: COMMERCIAL

## 2024-11-26 ENCOUNTER — APPOINTMENT (OUTPATIENT)
Dept: PHYSICAL THERAPY | Age: 59
End: 2024-11-26
Payer: COMMERCIAL

## 2024-12-03 ENCOUNTER — HOSPITAL ENCOUNTER (OUTPATIENT)
Dept: PHYSICAL THERAPY | Age: 59
Setting detail: RECURRING SERIES
Discharge: HOME | End: 2024-12-06
Payer: COMMERCIAL

## 2024-12-03 PROCEDURE — 97110 THERAPEUTIC EXERCISES: CPT

## 2024-12-03 ASSESSMENT — PAIN SCALES - GENERAL
PAINLEVEL_OUTOF10: 0
PAINLEVEL_OUTOF10: 0

## 2024-12-03 NOTE — PROGRESS NOTES
\David Marie  : 1965  Primary: [facility] (Worker's [employer])  Secondary:  [facility]  [address]  [address]  [facility] [address]  Phone: [phone]  Fax: [phone] Plan Frequency: 1-2x/wk for 30 more days      Plan of Care/Certification Expiration Date: 24          Plan of Care/Certification Expiration Date:  Plan of Care/Certification Expiration Date: 24      Frequency/Duration:   Plan Frequency: 1-2x/wk for 30 more days        Time In/Out:   Time In: 0730  Time Out: 0815      PT Visit Info:    Total # of Visits to Date:       Visit Count:  29    OUTPATIENT PHYSICAL THERAPY:   Treatment Note 12/3/2024       Episode  (R shoulder resurfacing)               Treatment Diagnosis:    Stiffness of right shoulder, not elsewhere classified  Right shoulder pain, unspecified chronicity  Medical/Referring Diagnosis:    Pain in right shoulder [M25.511]  Other chronic pain [G89.29]      Referring Physician:  Josephine Perdomo APRN - NP MD Orders:  PT Eval and Treat, 1-2x/wk for 4-6 weeks  Return MD Appt:  24   Date of Onset:  Onset Date: 24     Allergies:   Bee venom  Restrictions/Precautions:   Post Surgical Precautions: R shoulder resurfacing      Interventions Planned (Treatment may consist of any combination of the following):     See Assessment Note    Subjective Comments:   Pt reports he feels good. 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   Initial Pain Level::     0/10  Post Session Pain Level:       0/10  Medications Last Reviewed:  12/3/2024  Updated Objective Findings:   Flex 170*  see progress note  Treatment   THERAPEUTIC EXERCISE: (40 minutes):    Exercises per grid below to improve mobility, strength, balance, and coordination.  Required minimal verbal and tactile cues to promote proper body alignment, promote proper body posture, and promote proper body mechanics.  Progressed resistance,

## 2024-12-05 ENCOUNTER — APPOINTMENT (OUTPATIENT)
Dept: PHYSICAL THERAPY | Age: 59
End: 2024-12-05
Payer: COMMERCIAL

## 2024-12-10 ENCOUNTER — HOSPITAL ENCOUNTER (OUTPATIENT)
Dept: PHYSICAL THERAPY | Age: 59
Setting detail: RECURRING SERIES
Discharge: HOME | End: 2024-12-13
Payer: COMMERCIAL

## 2024-12-10 PROCEDURE — 97110 THERAPEUTIC EXERCISES: CPT

## 2024-12-10 ASSESSMENT — PAIN SCALES - GENERAL: PAINLEVEL_OUTOF10: 0

## 2024-12-12 ENCOUNTER — APPOINTMENT (OUTPATIENT)
Dept: PHYSICAL THERAPY | Age: 59
End: 2024-12-12
Payer: COMMERCIAL

## 2024-12-17 ENCOUNTER — HOSPITAL ENCOUNTER (OUTPATIENT)
Dept: PHYSICAL THERAPY | Age: 59
Setting detail: RECURRING SERIES
Discharge: HOME | End: 2024-12-20
Payer: COMMERCIAL

## 2024-12-17 PROCEDURE — 97140 MANUAL THERAPY 1/> REGIONS: CPT

## 2024-12-17 PROCEDURE — 97110 THERAPEUTIC EXERCISES: CPT

## 2024-12-17 ASSESSMENT — PAIN SCALES - GENERAL: PAINLEVEL_OUTOF10: 0

## 2024-12-17 NOTE — PROGRESS NOTES
\David Marie  : 1965  Primary: [employer] (Worker's Comp)  Secondary:  [facility]  [address]  [address]  [facility] [address]  Phone: [phone]  Fax: [phone] Plan Frequency: 1-2x/wk for 30 more days      Plan of Care/Certification Expiration Date: 25          Plan of Care/Certification Expiration Date:  Plan of Care/Certification Expiration Date: 25      Frequency/Duration:   Plan Frequency: 1-2x/wk for 30 more days        Time In/Out:   Time In: 0730  Time Out: 0815      PT Visit Info:    Total # of Visits Approved: 36  Total # of Visits to Date: 31      Visit Count:  31    OUTPATIENT PHYSICAL THERAPY:   Treatment Note 2024       Episode  (R shoulder resurfacing)               Treatment Diagnosis:    Stiffness of right shoulder, not elsewhere classified  Right shoulder pain, unspecified chronicity  Medical/Referring Diagnosis:    Pain in right shoulder [M25.511]  Other chronic pain [G89.29]      Referring Physician:  Josephine Perdomo APRN - NP  MD Orders:  PT Eval and Treat, 1-2x/wk for 4-6 weeks  Return MD Appt:  24   Date of Onset:  Onset Date: 24     Allergies:   Bee venom  Restrictions/Precautions:   Post Surgical Precautions: R shoulder resurfacing      Interventions Planned (Treatment may consist of any combination of the following):     See Assessment Note    Subjective Comments:   Pt reports he feels like it is getting easier at work.   Initial Pain Level::     0/10  Post Session Pain Level:       0/10  Medications Last Reviewed:  2024  Updated Objective Findings:   Flex 170*  see progress note  Treatment   THERAPEUTIC EXERCISE: (30 minutes):    Exercises per grid below to improve mobility, strength, balance, and coordination.  Required minimal verbal and tactile cues to promote proper body alignment, promote proper body posture, and promote proper body mechanics.  Progressed resistance, range, repetitions, and

## 2024-12-31 ENCOUNTER — HOSPITAL ENCOUNTER (OUTPATIENT)
Dept: PHYSICAL THERAPY | Age: 59
Setting detail: RECURRING SERIES
Discharge: HOME | End: 2025-01-03
Payer: COMMERCIAL

## 2024-12-31 PROCEDURE — 97140 MANUAL THERAPY 1/> REGIONS: CPT

## 2024-12-31 PROCEDURE — 97110 THERAPEUTIC EXERCISES: CPT

## 2024-12-31 ASSESSMENT — PAIN SCALES - GENERAL: PAINLEVEL_OUTOF10: 0

## 2024-12-31 NOTE — PROGRESS NOTES
\David Marie  : 1965  Primary: [facility] (Worker's [employer])  Secondary:  [facility]  [address]  [address]  [facility] [address]  Phone: [phone]  Fax: [phone] Plan Frequency: 1-2x/wk for 30 more days      Plan of Care/Certification Expiration Date: 25          Plan of Care/Certification Expiration Date:  Plan of Care/Certification Expiration Date: 25      Frequency/Duration:   Plan Frequency: 1-2x/wk for 30 more days        Time In/Out:   Time In: 815  Time Out: 905      PT Visit Info:    Total # of Visits Approved: 36  Total # of Visits to Date: 32      Visit Count:  32    OUTPATIENT PHYSICAL THERAPY:   Treatment Note 2024       Episode  (R shoulder resurfacing)               Treatment Diagnosis:    Stiffness of right shoulder, not elsewhere classified  Right shoulder pain, unspecified chronicity  Medical/Referring Diagnosis:    Pain in right shoulder [M25.511]  Other chronic pain [G89.29]      Referring Physician:  Josephine Perdomo APRN - NP  MD Orders:  PT Eval and Treat, 1-2x/wk for 4-6 weeks  Return MD Appt:  24   Date of Onset:  Onset Date: 24     Allergies:   Bee venom  Restrictions/Precautions:   Post Surgical Precautions: R shoulder resurfacing      Interventions Planned (Treatment may consist of any combination of the following):     See Assessment Note    Subjective Comments:   Pt reports he has not been consistent with exercises and stretches as he was out of town and hosted family. 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   Initial Pain Level::     0/10  Post Session Pain Level:       0/10  Medications Last Reviewed:  2024  Updated Objective Findings:   Flex 170*  see progress note  Treatment   THERAPEUTIC EXERCISE: (30 minutes):    Exercises per grid below to improve mobility, strength, balance, and coordination.  Required minimal verbal and tactile cues to promote proper body

## 2024-12-31 NOTE — THERAPY RECERTIFICATION
David Marie  : 1965  Primary: [facility] (Worker[employer])  Secondary:  [facility]  [address]  [address]  [facility] [address]  Phone: [phone]  Fax: [phone] Plan Frequency: 1-2x/wk for 30 more days    Plan of Care/Certification Expiration Date: 25        Plan of Care/Certification Expiration Date:  Plan of Care/Certification Expiration Date: 25    Frequency/Duration:   Plan Frequency: 1-2x/wk for 30 more days      Time In/Out:   Time In: 815  Time Out: 905      PT Visit Info:    Total # of Visits Approved: 36  Total # of Visits to Date: 32      Visit Count:  32                OUTPATIENT PHYSICAL THERAPY:             Recertification 2024               Episode (R shoulder resurfacing)         Treatment Diagnosis:     Stiffness of right shoulder, not elsewhere classified  Right shoulder pain, unspecified chronicity  Medical/Referring Diagnosis:    Pain in right shoulder [M25.511]  Other chronic pain [G89.29]      Referring Physician:  Josephine Perdomo APRN - NP  MD Orders:  PT Eval and Treat, 1-2x/wk for 4-6 weeks  Return MD Appt:  24  Date of Onset:  Onset Date: 24  Most recent surgery, also has past surgery on 10/27/23 which did not recovery properly and therefore caused him to have this surgery  Allergies:  Bee venom  Restrictions/Precautions:    Post Surgical Precautions: shoulder resurfacing      Medications Last Reviewed:  2024     SUBJECTIVE   History of Injury/Illness (Reason for Referral):  Pt had an injury at work that resulted in R shoulder surgery on 10/27/23. He went to [facility] and did not recover well. MD decided to do a shoulder resurfacing to help. That surgery was performed on 24. It is of note that between the shoulder surgeries he also had knee surgery for meniscal issues.   Patient Stated Goal(s):  \"get back to work, improve strength and ROM\"  Initial Pain Level:      0/10   Post Session Pain Level:

## 2025-01-07 ENCOUNTER — HOSPITAL ENCOUNTER (OUTPATIENT)
Dept: PHYSICAL THERAPY | Age: 60
Setting detail: RECURRING SERIES
Discharge: HOME | End: 2025-01-10
Payer: COMMERCIAL

## 2025-01-07 PROCEDURE — 97110 THERAPEUTIC EXERCISES: CPT

## 2025-01-07 PROCEDURE — 97140 MANUAL THERAPY 1/> REGIONS: CPT

## 2025-01-07 ASSESSMENT — PAIN SCALES - GENERAL: PAINLEVEL_OUTOF10: 0

## 2025-01-07 NOTE — PROGRESS NOTES
effect and no complications noted. Clinical outcome of the treatment included needling treatment outcome: decrease in pain levels, improved ROM, and improved muscle tone and play.    Dry Needles Used: 3   Dry Needles Removed: 3     Patient has been educated with post-treatment care including hydration and mobility.     Treatment/Session Summary:    Treatment Assessment:   Pt had improved motion and pain w/ mobilizations and stretches to thoracic spine and chest muscles. His pec muscles were very tight and tender with STM. Pt has 3 more approved visits at this time.   Communication/Consultation:  None today  Equipment provided today:  None  Recommendations/Intent for next treatment session: Next visit will focus on progression of functional tasks as tolerated.    >Total Treatment Billable Duration: 25 mins therex, 15 mins manual   Time In: 0730  Time Out: 0817    Josephine Rosas PT         Charge Capture  Events  [employer] Portal  Appt Desk  Attendance Report     Future Appointments   Date Time Provider Department Center   1/14/2025  7:30 AM Josephine Rosas PT [facility]   1/21/2025  7:30 AM Josephine Rosas PT [facility]

## 2025-01-14 ENCOUNTER — HOSPITAL ENCOUNTER (OUTPATIENT)
Dept: PHYSICAL THERAPY | Age: 60
Setting detail: RECURRING SERIES
Discharge: HOME | End: 2025-01-17
Payer: COMMERCIAL

## 2025-01-14 PROCEDURE — 97140 MANUAL THERAPY 1/> REGIONS: CPT

## 2025-01-14 PROCEDURE — 97110 THERAPEUTIC EXERCISES: CPT

## 2025-01-14 ASSESSMENT — PAIN SCALES - GENERAL: PAINLEVEL_OUTOF10: 0

## 2025-01-14 NOTE — PROGRESS NOTES
techniques  For the purpose of: trigger point release , increase blood flow, local twitch response that leads to alternation in length and tension of muscle fibers, and neurophysiology effect, resulting in reduction of central and peripheral sensitization to pain    The patient tolerated the treatment with a positive treatment effect and no complications noted. Clinical outcome of the treatment included needling treatment outcome: decrease in pain levels, improved ROM, and improved muscle tone and play.    Dry Needles Used: 3   Dry Needles Removed: 3     Patient has been educated with post-treatment care including hydration and mobility.     Treatment/Session Summary:    Treatment Assessment:   Pt had improved motion with stretching and dry needles. Some improvement in pinching with overhead motions as well. Overall his shoulder is improving.   Communication/Consultation:  None today  Equipment provided today:  None  Recommendations/Intent for next treatment session: Next visit will focus on progression of functional tasks as tolerated.    >Total Treatment Billable Duration: 40 mins therex, 14 mins manual   Time In: 0730  Time Out: 0827    Josephine Rosas PT         Charge Capture  Events  [employer] Portal  Appt Desk  Attendance Report     Future Appointments   Date Time Provider Department Center   1/21/2025  7:30 AM Josephine Rosas PT [facility]   1/28/2025  7:30 AM Josephine Rosas PT [facility]                show

## 2025-01-21 ENCOUNTER — HOSPITAL ENCOUNTER (OUTPATIENT)
Dept: PHYSICAL THERAPY | Age: 60
Setting detail: RECURRING SERIES
Discharge: HOME | End: 2025-01-24
Payer: COMMERCIAL

## 2025-01-21 PROCEDURE — 97110 THERAPEUTIC EXERCISES: CPT

## 2025-01-21 PROCEDURE — 97140 MANUAL THERAPY 1/> REGIONS: CPT

## 2025-01-21 ASSESSMENT — PAIN SCALES - GENERAL: PAINLEVEL_OUTOF10: 0

## 2025-01-21 NOTE — PROGRESS NOTES
\David Marie  : 1965  Primary: [facility] ([employer])  Secondary:  [facility]  [address]  [address]  [facility] [address]  Phone: [phone]  Fax: [phone] Plan Frequency: 1-2x/wk for 30 more days      Plan of Care/Certification Expiration Date: 25          Plan of Care/Certification Expiration Date:  Plan of Care/Certification Expiration Date: 25      Frequency/Duration:   Plan Frequency: 1-2x/wk for 30 more days        Time In/Out:   Time In: 730  Time Out: 826      PT Visit Info:    Total # of Visits Approved: 36  Total # of Visits to Date: 35      Visit Count:  35    OUTPATIENT PHYSICAL THERAPY:   Treatment Note 2025       Episode  (R shoulder resurfacing)               Treatment Diagnosis:    Stiffness of right shoulder, not elsewhere classified  Right shoulder pain, unspecified chronicity  Medical/Referring Diagnosis:    Pain in right shoulder [M25.511]  Other chronic pain [G89.29]      Referring Physician:  Josephine Perdomo APRN - NP  MD Orders:  PT Eval and Treat, 1-2x/wk for 4-6 weeks  Return MD Appt:  24   Date of Onset:  Onset Date: 24     Allergies:   Bee venom  Restrictions/Precautions:   Post Surgical Precautions: R shoulder resurfacing      Interventions Planned (Treatment may consist of any combination of the following):     See Assessment Note    Subjective Comments:   Pt reports he is doing better with the computer but still having pain/soreness w/ abduction.   Initial Pain Level::     0/10  Post Session Pain Level:       0/10  Medications Last Reviewed:  2025  Updated Objective Findings:   Flex 170*  see progress note  Treatment   THERAPEUTIC EXERCISE: (45 minutes):    Exercises per grid below to improve mobility, strength, balance, and coordination.  Required minimal verbal and tactile cues to promote proper body alignment, promote proper body posture, and promote proper body mechanics.  Progressed

## 2025-01-28 ENCOUNTER — HOSPITAL ENCOUNTER (OUTPATIENT)
Dept: PHYSICAL THERAPY | Age: 60
Setting detail: RECURRING SERIES
Discharge: HOME | End: 2025-01-31
Payer: COMMERCIAL

## 2025-01-28 PROCEDURE — 97110 THERAPEUTIC EXERCISES: CPT

## 2025-01-28 PROCEDURE — 97140 MANUAL THERAPY 1/> REGIONS: CPT

## 2025-01-28 ASSESSMENT — PAIN SCALES - GENERAL: PAINLEVEL_OUTOF10: 0

## 2025-01-28 NOTE — THERAPY RECERTIFICATION
David Marie  : 1965  Primary: [facility] (Worker's [employer])  Secondary:  [facility]  [address]  [address]  [facility] [address]  Phone: [phone]  Fax: [phone] Plan Frequency: 1-2x/wk for 60 days    Plan of Care/Certification Expiration Date: 25        Plan of Care/Certification Expiration Date:  Plan of Care/Certification Expiration Date: 25    Frequency/Duration:   Plan Frequency: 1-2x/wk for 60 days      Time In/Out:   Time In: 730  Time Out: 822      PT Visit Info:    Total # of Visits Approved: 36  Total # of Visits to Date: 36      Visit Count:  36                OUTPATIENT PHYSICAL THERAPY:             Recertification 2025               Episode (R shoulder resurfacing)         Treatment Diagnosis:     Stiffness of right shoulder, not elsewhere classified  Right shoulder pain, unspecified chronicity  Medical/Referring Diagnosis:    Pain in right shoulder [M25.511]  Other chronic pain [G89.29]      Referring Physician:  Josephine Perdomo APRN - NP  MD Orders:  PT Eval and Treat, 1-2x/wk for 4-6 weeks  Return MD Appt:  24  Date of Onset:  Onset Date: 24  Most recent surgery, also has past surgery on 10/27/23 which did not recovery properly and therefore caused him to have this surgery  Allergies:  Bee venom  Restrictions/Precautions:    Post Surgical Precautions: shoulder resurfacing      Medications Last Reviewed:  2025     SUBJECTIVE   History of Injury/Illness (Reason for Referral):  Pt had an injury at work that resulted in R shoulder surgery on 10/27/23. He went to AT and did not recover well. MD decided to do a shoulder resurfacing to help. That surgery was performed on 24. It is of note that between the shoulder surgeries he also had knee surgery for meniscal issues.   Patient Stated Goal(s):  \"get back to work, improve strength and ROM\"  Initial Pain Level:      0/10   Post Session Pain Level:

## 2025-01-28 NOTE — PROGRESS NOTES
weight both horizontal and regular  15x standing 2#  Standing 2# 20x   Horizontal 2# 20x sidelying   3# cables in stand 2x10   Shrug      15# 20x    Shoulder ER     20x sidelying no weight 20x sidelying lime band  20x supine lime band at 90/90 20x cables 3#   Shoulder IR 20x lime band supine      20x cables 7#   Thoracic extension   Prayer position x5 10x at //      Diagonal/PNF 20x w/o resistance         Biceps          Triceps          Push ups    Prone press push up 5x      Arm balance          Pec stretch    Cobra 10x       Functional work lifts       Releasing pin 7# cable pull motion   Stretching to joint w/ band          Driving/shifting gears          Upper trap stretch   5x R       Lumbar extension    10x in //      Steps    1 flight        MANUAL THERAPY: (10 minutes):   Joint mobilization and Soft tissue mobilization was utilized and necessary because of the patient's restricted joint motion, painful spasm, loss of articular motion, and restricted motion of soft tissue.   Date:  12/31/24 Date:  1/7/25 Date:  1/14/25 Date:  1/21/25 Date:  1/28/25   Parameters Parameters Parameters Parameters Parameters   STM to paraspinals in prone Mobilizations to thoracic spine Mobilizations to thoracic spine Mobilizations to thoracic spine Mobilization to R shoulder in supine   Mobilizations to thoracic spine in prone STM to R pec in supine to improve ROM and pain Mobilizations to R shoulder in supine Mobilizations to R shoulder in supine PROM to R shoulder in supine for ER/IR     PROM to R shoulder in supine for abduction and ER PROM to R shoulder in supine for ER/IR             FUNCTIONAL DRY NEEDLING: (0 minutes untimed):      With written consent received and precautions reviewed, instrument-assisted soft tissue mobilization was performed to:  Muscle(s): right deltoid  Needle(s) size used: .30 x 50mm  Technique(s): use of pistoning techniques  For the purpose of: trigger point release , increase blood flow, local

## 2025-02-18 ENCOUNTER — HOSPITAL ENCOUNTER (OUTPATIENT)
Dept: PHYSICAL THERAPY | Age: 60
Setting detail: RECURRING SERIES
Discharge: HOME | End: 2025-02-21

## 2025-02-18 PROCEDURE — 97140 MANUAL THERAPY 1/> REGIONS: CPT

## 2025-02-18 PROCEDURE — 97110 THERAPEUTIC EXERCISES: CPT

## 2025-02-18 ASSESSMENT — PAIN SCALES - GENERAL: PAINLEVEL_OUTOF10: 0

## 2025-02-18 NOTE — PROGRESS NOTES
\David Marie  : 1965  Primary:  (Worker's Comp)  Secondary:  [facility]  [address]  [address]  [facility] [address]  Phone: [phone]  Fax: [phone] Plan Frequency: 1-2x/wk for 60 days      Plan of Care/Certification Expiration Date: 25          Plan of Care/Certification Expiration Date:  Plan of Care/Certification Expiration Date: 25      Frequency/Duration:   Plan Frequency: 1-2x/wk for 60 days        Time In/Out:   Time In: 0815  Time Out: 0900      PT Visit Info:    Total # of Visits Approved: 52  Total # of Visits to Date: 37      Visit Count:  37    OUTPATIENT PHYSICAL THERAPY:   Treatment Note 2025       Episode  (R shoulder resurfacing)               Treatment Diagnosis:    Stiffness of right shoulder, not elsewhere classified  Right shoulder pain, unspecified chronicity  Medical/Referring Diagnosis:    Pain in right shoulder [M25.511]  Other chronic pain [G89.29]      Referring Physician:  Josephine Perdomo APRN - NP MD Orders:  PT Eval and Treat, 1-2x/wk for 4-6 weeks  Return MD Appt:  24   Date of Onset:  Onset Date: 24     Allergies:   Bee venom  Restrictions/Precautions:   Post Surgical Precautions: R shoulder resurfacing      Interventions Planned (Treatment may consist of any combination of the following):     See Assessment Note    Subjective Comments:   Pt reports overall his shoulder has been doing about the same in the 3 week break while waiting on insurance.  Initial Pain Level::     0/10  Post Session Pain Level:       0/10  Medications Last Reviewed:  2025  Updated Objective Findings:   Flex 170*  see progress note  Treatment   THERAPEUTIC EXERCISE: (30 minutes):    Exercises per grid below to improve mobility, strength, balance, and coordination.  Required minimal verbal and tactile cues to promote proper body alignment, promote proper body posture, and promote proper body mechanics.  Progressed resistance, range,

## 2025-02-19 NOTE — PROGRESS NOTES
\David Marie  : 1965  Primary: [facility] (Worker's [employer])  Secondary:  [facility]  [address]  [address]  [facility] [address]  Phone: [phone]  Fax: [phone] Plan Frequency: 1-2x/wk for 30 more days      Plan of Care/Certification Expiration Date: 25          Plan of Care/Certification Expiration Date:  Plan of Care/Certification Expiration Date: 25      Frequency/Duration:   Plan Frequency: 1-2x/wk for 30 more days        Time In/Out:   Time In: 0945  Time Out: 1035      PT Visit Info:    Total # of Visits Approved: 36  Total # of Visits to Date: 30      Visit Count:  30    OUTPATIENT PHYSICAL THERAPY:   Treatment Note 12/10/2024       Episode  (R shoulder resurfacing)               Treatment Diagnosis:    Stiffness of right shoulder, not elsewhere classified  Right shoulder pain, unspecified chronicity  Medical/Referring Diagnosis:    Pain in right shoulder [M25.511]  Other chronic pain [G89.29]      Referring Physician:  Josephine Perdomo APRN - NP  MD Orders:  PT Eval and Treat, 1-2x/wk for 4-6 weeks  Return MD Appt:  24   Date of Onset:  Onset Date: 24     Allergies:   Bee venom  Restrictions/Precautions:   Post Surgical Precautions: R shoulder resurfacing      Interventions Planned (Treatment may consist of any combination of the following):     See Assessment Note    Subjective Comments:   Pt reports he feels good. Some soreness with computer work and getting his seatbelt. 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   Initial Pain Level::     0/10  Post Session Pain Level:       0/10  Medications Last Reviewed:  12/10/2024  Updated Objective Findings:   Flex 170*  see progress note  Treatment   THERAPEUTIC EXERCISE: (40 minutes):    Exercises per grid below to improve mobility, strength, balance, and coordination.  Required minimal verbal and tactile cues to promote proper body alignment, promote proper body posture, and promote  4 = No assist / stand by assistance

## 2025-02-25 ENCOUNTER — HOSPITAL ENCOUNTER (OUTPATIENT)
Dept: PHYSICAL THERAPY | Age: 60
Setting detail: RECURRING SERIES
Discharge: HOME | End: 2025-02-28
Payer: COMMERCIAL

## 2025-02-25 PROCEDURE — 97140 MANUAL THERAPY 1/> REGIONS: CPT

## 2025-02-25 PROCEDURE — 97110 THERAPEUTIC EXERCISES: CPT

## 2025-02-25 ASSESSMENT — PAIN SCALES - GENERAL: PAINLEVEL_OUTOF10: 0

## 2025-02-25 NOTE — PROGRESS NOTES
\David Marie  : 1965  Primary: [facility] (Worker's [employer])  Secondary:  [facility]  [address]  [address]  [facility] [address]  Phone: [phone]  Fax: [phone] Plan Frequency: 1-2x/wk for 60 days      Plan of Care/Certification Expiration Date: 25          Plan of Care/Certification Expiration Date:  Plan of Care/Certification Expiration Date: 25      Frequency/Duration:   Plan Frequency: 1-2x/wk for 60 days        Time In/Out:   Time In: 0815  Time Out: 0900      PT Visit Info:    Total # of Visits Approved: 52  Total # of Visits to Date: 38      Visit Count:  38    OUTPATIENT PHYSICAL THERAPY:   Treatment Note 2025       Episode  (R shoulder resurfacing)               Treatment Diagnosis:    Stiffness of right shoulder, not elsewhere classified  Right shoulder pain, unspecified chronicity  Medical/Referring Diagnosis:    Pain in right shoulder [M25.511]  Other chronic pain [G89.29]      Referring Physician:  Josephine Perdomo APRN - NP  MD Orders:  PT Eval and Treat, 1-2x/wk for 4-6 weeks  Return MD Appt:  24   Date of Onset:  Onset Date: 24     Allergies:   Bee venom  Restrictions/Precautions:   Post Surgical Precautions: R shoulder resurfacing      Interventions Planned (Treatment may consist of any combination of the following):     See Assessment Note    Subjective Comments:   Pt reports he had a ton of pain yesterday and pinching feelings. He hasn't had as much pain today only when he moves it a certain way.   Initial Pain Level::     0/10  Post Session Pain Level:       0/10  Medications Last Reviewed:  2025  Updated Objective Findings:   Flex 170*  see progress note  Treatment   THERAPEUTIC EXERCISE: (30 minutes):    Exercises per grid below to improve mobility, strength, balance, and coordination.  Required minimal verbal and tactile cues to promote proper body alignment, promote proper body posture, and promote proper

## 2025-02-27 ENCOUNTER — HOSPITAL ENCOUNTER (OUTPATIENT)
Dept: PHYSICAL THERAPY | Age: 60
Setting detail: RECURRING SERIES
End: 2025-02-27
Payer: COMMERCIAL

## 2025-02-27 PROCEDURE — 97110 THERAPEUTIC EXERCISES: CPT

## 2025-02-27 ASSESSMENT — PAIN SCALES - GENERAL
PAINLEVEL_OUTOF10: 0
PAINLEVEL_OUTOF10: 0

## 2025-02-27 NOTE — PROGRESS NOTES
\David Marie  : 1965  Primary: [facility] (Worker's [employer])  Secondary:  [facility]  [address]  [address]  [facility] [address]  Phone: [phone]  Fax: [phone] Plan Frequency: 1-2x/wk for 60 days      Plan of Care/Certification Expiration Date: 25          Plan of Care/Certification Expiration Date:  Plan of Care/Certification Expiration Date: 25      Frequency/Duration:   Plan Frequency: 1-2x/wk for 60 days        Time In/Out:   Time In: 0730  Time Out: 0820      PT Visit Info:    Total # of Visits Approved: 52  Total # of Visits to Date: 39      Visit Count:  39    OUTPATIENT PHYSICAL THERAPY:   Treatment Note 2025       Episode  (R shoulder resurfacing)               Treatment Diagnosis:    Stiffness of right shoulder, not elsewhere classified  Right shoulder pain, unspecified chronicity  Medical/Referring Diagnosis:    Pain in right shoulder [M25.511]  Other chronic pain [G89.29]      Referring Physician:  Josephine Perdomo APRN - NP  MD Orders:  PT Eval and Treat, 1-2x/wk for 4-6 weeks  Return MD Appt:  24   Date of Onset:  Onset Date: 24     Allergies:   Bee venom  Restrictions/Precautions:   Post Surgical Precautions: R shoulder resurfacing      Interventions Planned (Treatment may consist of any combination of the following):     See Assessment Note    Subjective Comments:   Pt reports he had improvements with dry needles.   Initial Pain Level::     0/10  Post Session Pain Level:       0/10  Medications Last Reviewed:  2025  Updated Objective Findings:   Flex 170*  see progress note  Treatment   THERAPEUTIC EXERCISE: (40 minutes):    Exercises per grid below to improve mobility, strength, balance, and coordination.  Required minimal verbal and tactile cues to promote proper body alignment, promote proper body posture, and promote proper body mechanics.  Progressed resistance, range, repetitions, and complexity of movement  Removed: 3     Patient has been educated with post-treatment care including hydration and mobility.     Treatment/Session Summary:    Treatment Assessment:   Pt had improved motion and pain post session at end ranges. He still has some pinching at times that can improve with postural and shoulder strengthening.   Communication/Consultation:  None today  Equipment provided today:  None  Recommendations/Intent for next treatment session: Next visit will focus on progression of functional tasks as tolerated.    >Total Treatment Billable Duration: 40 mins therex  Time In: 0730  Time Out: 0820    Josephine Rosas PT         Charge Capture  Events  [facility] Portal  Appt Desk  Attendance Report     Future Appointments   Date Time Provider Department Center   3/4/2025  8:15 AM Josephine Rosas, PT [facility]   3/11/2025  8:15 AM Josephine Rosas, PT [facility]   3/18/2025  7:30 AM Josephine Rosas, PT [facility]   3/25/2025  7:30 AM Josephine Rosas, PT [facility]   4/1/2025  7:30 AM Josephine Rosas, PT [facility]   4/8/2025  7:30 AM Josephine Rosas, PT [facility]   4/15/2025  7:30 AM Josephine Rosas, PT [facility]   4/22/2025  7:30 AM Josephine Rosas, PT [facility]   4/29/2025  7:30 AM Josephine Rosas, PT [facility]   5/6/2025  7:30 AM Josephine Rosas, PT [facility]   5/13/2025  7:30 AM Tika Rosast, PT [facility]   5/20/2025  7:30 AM Tika Rosast, PT [facility]

## 2025-03-04 ENCOUNTER — HOSPITAL ENCOUNTER (OUTPATIENT)
Dept: PHYSICAL THERAPY | Age: 60
Setting detail: RECURRING SERIES
Discharge: HOME | End: 2025-03-07
Payer: COMMERCIAL

## 2025-03-04 PROCEDURE — 97110 THERAPEUTIC EXERCISES: CPT

## 2025-03-04 PROCEDURE — 97140 MANUAL THERAPY 1/> REGIONS: CPT

## 2025-03-04 ASSESSMENT — PAIN SCALES - GENERAL: PAINLEVEL_OUTOF10: 0

## 2025-03-04 NOTE — PROGRESS NOTES
\David Marie  : 1965  Primary: [facility] (Worker's [employer])  Secondary:  [facility]  [address]  [address]  [facility] [address]  Phone: [phone]  Fax: [phone] Plan Frequency: 1-2x/wk for 60 days      Plan of Care/Certification Expiration Date: 25          Plan of Care/Certification Expiration Date:  Plan of Care/Certification Expiration Date: 25      Frequency/Duration:   Plan Frequency: 1-2x/wk for 60 days        Time In/Out:   Time In: 0810  Time Out: 0907      PT Visit Info:    Total # of Visits Approved: 52  Total # of Visits to Date: 40      Visit Count:  40    OUTPATIENT PHYSICAL THERAPY:   Treatment Note 3/4/2025       Episode  (R shoulder resurfacing)               Treatment Diagnosis:    Stiffness of right shoulder, not elsewhere classified  Right shoulder pain, unspecified chronicity  Medical/Referring Diagnosis:    Pain in right shoulder [M25.511]  Other chronic pain [G89.29]      Referring Physician:  Josephine Perdomo APRN - NP  MD Orders:  PT Eval and Treat, 1-2x/wk for 4-6 weeks  Return MD Appt:  24   Date of Onset:  Onset Date: 24     Allergies:   Bee venom  Restrictions/Precautions:   Post Surgical Precautions: R shoulder resurfacing      Interventions Planned (Treatment may consist of any combination of the following):     See Assessment Note    Subjective Comments:   Pt reports he is still getting pinching with overhead movements. Is getting discouraged.   Initial Pain Level::     0/10  Post Session Pain Level:       0/10  Medications Last Reviewed:  3/4/2025  Updated Objective Findings:   Flex 170*  see progress note  Treatment   THERAPEUTIC EXERCISE: (45 minutes):    Exercises per grid below to improve mobility, strength, balance, and coordination.  Required minimal verbal and tactile cues to promote proper body alignment, promote proper body posture, and promote proper body mechanics.  Progressed resistance, range,

## 2025-03-11 ENCOUNTER — HOSPITAL ENCOUNTER (OUTPATIENT)
Dept: PHYSICAL THERAPY | Age: 60
Setting detail: RECURRING SERIES
Discharge: HOME | End: 2025-03-14
Payer: COMMERCIAL

## 2025-03-11 PROCEDURE — 97140 MANUAL THERAPY 1/> REGIONS: CPT

## 2025-03-11 PROCEDURE — 97110 THERAPEUTIC EXERCISES: CPT

## 2025-03-11 ASSESSMENT — PAIN SCALES - GENERAL: PAINLEVEL_OUTOF10: 0

## 2025-03-11 NOTE — PROGRESS NOTES
\David Marie  : 1965  Primary: [facility] (Worker's [employer])  Secondary:  [facility]  [address]  [address]  [facility] [address]  Phone: [phone]  Fax: [phone] Plan Frequency: 1-2x/wk for 60 days      Plan of Care/Certification Expiration Date: 25          Plan of Care/Certification Expiration Date:  Plan of Care/Certification Expiration Date: 25      Frequency/Duration:   Plan Frequency: 1-2x/wk for 60 days        Time In/Out:   Time In: 08  Time Out: 09      PT Visit Info:    Total # of Visits Approved: 52  Total # of Visits to Date: 41      Visit Count:  41    OUTPATIENT PHYSICAL THERAPY:   Treatment Note 3/11/2025       Episode  (R shoulder resurfacing)               Treatment Diagnosis:    Stiffness of right shoulder, not elsewhere classified  Right shoulder pain, unspecified chronicity  Medical/Referring Diagnosis:    Pain in right shoulder [M25.511]  Other chronic pain [G89.29]      Referring Physician:  Josephine Perdomo APRN - NP  MD Orders:  PT Eval and Treat, 1-2x/wk for 4-6 weeks  Return MD Appt:  24   Date of Onset:  Onset Date: 24     Allergies:   Bee venom  Restrictions/Precautions:   Post Surgical Precautions: R shoulder resurfacing      Interventions Planned (Treatment may consist of any combination of the following):     See Assessment Note    Subjective Comments:   Pt reports pinching is still present with overhead and with reaching to his side to grab items.   Initial Pain Level::     0/10  Post Session Pain Level:       0/10  Medications Last Reviewed:  3/11/2025  Updated Objective Findings:   Flex 170*  see progress note  Treatment   THERAPEUTIC EXERCISE: (30 minutes):    Exercises per grid below to improve mobility, strength, balance, and coordination.  Required minimal verbal and tactile cues to promote proper body alignment, promote proper body posture, and promote proper body mechanics.  Progressed resistance,

## 2025-03-18 ENCOUNTER — HOSPITAL ENCOUNTER (OUTPATIENT)
Dept: PHYSICAL THERAPY | Age: 60
Setting detail: RECURRING SERIES
Discharge: HOME | End: 2025-03-21
Payer: COMMERCIAL

## 2025-03-18 PROCEDURE — 97110 THERAPEUTIC EXERCISES: CPT

## 2025-03-18 PROCEDURE — 97140 MANUAL THERAPY 1/> REGIONS: CPT

## 2025-03-18 ASSESSMENT — PAIN SCALES - GENERAL: PAINLEVEL_OUTOF10: 0

## 2025-03-18 NOTE — PROGRESS NOTES
\David Marie  : 1965  Primary: [facility] (Worker's [employer])  Secondary:  [facility]  [address]  [address]  [facility] [address]  Phone: [phone]  Fax: [phone] Plan Frequency: 1-2x/wk for 60 days      Plan of Care/Certification Expiration Date: 25          Plan of Care/Certification Expiration Date:  Plan of Care/Certification Expiration Date: 25      Frequency/Duration:   Plan Frequency: 1-2x/wk for 60 days        Time In/Out:   Time In: 730  Time Out: 08      PT Visit Info:    Total # of Visits Approved: 52  Total # of Visits to Date: 42      Visit Count:  42    OUTPATIENT PHYSICAL THERAPY:   Treatment Note 3/18/2025       Episode  (R shoulder resurfacing)               Treatment Diagnosis:    Stiffness of right shoulder, not elsewhere classified  Right shoulder pain, unspecified chronicity  Medical/Referring Diagnosis:    Pain in right shoulder [M25.511]  Other chronic pain [G89.29]      Referring Physician:  Josephine Perdomo APRN - NP  MD Orders:  PT Eval and Treat, 1-2x/wk for 4-6 weeks  Return MD Appt:  24   Date of Onset:  Onset Date: 24     Allergies:   Bee venom  Restrictions/Precautions:   Post Surgical Precautions: R shoulder resurfacing      Interventions Planned (Treatment may consist of any combination of the following):     See Assessment Note    Subjective Comments:   Pt reports pinching is still bothering him.   Initial Pain Level::     0/10  Post Session Pain Level:       0/10  Medications Last Reviewed:  3/18/2025  Updated Objective Findings:   Flex 170*  see progress note  Treatment   THERAPEUTIC EXERCISE: (30 minutes):    Exercises per grid below to improve mobility, strength, balance, and coordination.  Required minimal verbal and tactile cues to promote proper body alignment, promote proper body posture, and promote proper body mechanics.  Progressed resistance, range, repetitions, and complexity of movement as

## 2025-03-25 ENCOUNTER — HOSPITAL ENCOUNTER (OUTPATIENT)
Dept: PHYSICAL THERAPY | Age: 60
Setting detail: RECURRING SERIES
Discharge: HOME | End: 2025-03-28
Payer: COMMERCIAL

## 2025-03-25 PROCEDURE — 97110 THERAPEUTIC EXERCISES: CPT

## 2025-03-25 ASSESSMENT — PAIN SCALES - GENERAL: PAINLEVEL_OUTOF10: 0

## 2025-03-25 NOTE — PROGRESS NOTES
\David Marie  : 1965  Primary: [facility] (Worker's [employer])  Secondary:  [facility]  [address]  [address]  [facility] [address]  Phone: [phone]  Fax: [phone] Plan Frequency: 1-2x/wk for 60 days      Plan of Care/Certification Expiration Date: 25          Plan of Care/Certification Expiration Date:  Plan of Care/Certification Expiration Date: 25      Frequency/Duration:   Plan Frequency: 1-2x/wk for 60 days        Time In/Out:   Time In: 815  Time Out: 905      PT Visit Info:    Total # of Visits Approved: 52  Total # of Visits to Date: 43      Visit Count:  43    OUTPATIENT PHYSICAL THERAPY:   Treatment Note 3/25/2025       Episode  (R shoulder resurfacing)               Treatment Diagnosis:    Stiffness of right shoulder, not elsewhere classified  Right shoulder pain, unspecified chronicity  Medical/Referring Diagnosis:    Pain in right shoulder [M25.511]  Other chronic pain [G89.29]      Referring Physician:  Josephine Perdomo APRN - NP  MD Orders:  PT Eval and Treat, 1-2x/wk for 4-6 weeks  Return MD Appt:  24   Date of Onset:  Onset Date: 24     Allergies:   Bee venom  Restrictions/Precautions:   Post Surgical Precautions: R shoulder resurfacing      Interventions Planned (Treatment may consist of any combination of the following):     See Assessment Note    Subjective Comments:   Pt reports pinching is present still but hasn't noticed it much because he has been so busy.   Initial Pain Level::     0/10  Post Session Pain Level:       0/10  Medications Last Reviewed:  3/25/2025  Updated Objective Findings:   Flex 170*  see progress note  Treatment   THERAPEUTIC EXERCISE: (45 minutes):    Exercises per grid below to improve mobility, strength, balance, and coordination.  Required minimal verbal and tactile cues to promote proper body alignment, promote proper body posture, and promote proper body mechanics.  Progressed resistance,

## 2025-04-01 ENCOUNTER — HOSPITAL ENCOUNTER (OUTPATIENT)
Dept: PHYSICAL THERAPY | Age: 60
Setting detail: RECURRING SERIES
Discharge: HOME | End: 2025-04-04
Payer: COMMERCIAL

## 2025-04-01 PROCEDURE — 97110 THERAPEUTIC EXERCISES: CPT

## 2025-04-01 PROCEDURE — 97140 MANUAL THERAPY 1/> REGIONS: CPT

## 2025-04-01 ASSESSMENT — PAIN SCALES - GENERAL: PAINLEVEL_OUTOF10: 0

## 2025-04-01 NOTE — PROGRESS NOTES
\David Marie  : 1965  Primary: [facility] (Worker's [employer])  Secondary:  [facility]  [address]  [address]  [facility] [address]  Phone: [phone]  Fax: [phone] Plan Frequency: 1-2x/wk for 60 days      Plan of Care/Certification Expiration Date: 25          Plan of Care/Certification Expiration Date:  Plan of Care/Certification Expiration Date: 25      Frequency/Duration:   Plan Frequency: 1-2x/wk for 60 days        Time In/Out:   Time In: 0730  Time Out: 0815      PT Visit Info:    Total # of Visits Approved: 52  Total # of Visits to Date: 44      Visit Count:  44    OUTPATIENT PHYSICAL THERAPY:   Treatment Note 2025       Episode  (R shoulder resurfacing)               Treatment Diagnosis:    Stiffness of right shoulder, not elsewhere classified  Right shoulder pain, unspecified chronicity  Medical/Referring Diagnosis:    Pain in right shoulder [M25.511]  Other chronic pain [G89.29]      Referring Physician:  Josephine Perdomo APRN - NP  MD Orders:  PT Eval and Treat, 1-2x/wk for 4-6 weeks  Return MD Appt:  24   Date of Onset:  Onset Date: 24     Allergies:   Bee venom  Restrictions/Precautions:   Post Surgical Precautions: R shoulder resurfacing      Interventions Planned (Treatment may consist of any combination of the following):     See Assessment Note    Subjective Comments:   Pt reports pinching is still bothering him. Had to lift blinds up overhead at work the other day and that was really sore. Goes back to MD today.   Initial Pain Level::     0/10  Post Session Pain Level:       0/10  Medications Last Reviewed:  2025  Updated Objective Findings:   Flex 170*  see progress note  Treatment   THERAPEUTIC EXERCISE: (30 minutes):    Exercises per grid below to improve mobility, strength, balance, and coordination.  Required minimal verbal and tactile cues to promote proper body alignment, promote proper body posture, and promote

## 2025-04-01 NOTE — THERAPY RECERTIFICATION
David Marie  : 1965  Primary: [facility] ([facility] [employer])  Secondary:  [facility]  [address]  [address]  [facility] [address]  Phone: [phone]  Fax: [phone] Plan Frequency: 1-2x/wk for 60 days    Plan of Care/Certification Expiration Date: 25        Plan of Care/Certification Expiration Date:  Plan of Care/Certification Expiration Date: 25    Frequency/Duration:   Plan Frequency: 1-2x/wk for 60 days      Time In/Out:   Time In: 730  Time Out: 0815      PT Visit Info:    Total # of Visits Approved: 52  Total # of Visits to Date: 44      Visit Count:  44                OUTPATIENT PHYSICAL THERAPY:             Recertification 2025               Episode (R shoulder resurfacing)         Treatment Diagnosis:     Stiffness of right shoulder, not elsewhere classified  Right shoulder pain, unspecified chronicity  Medical/Referring Diagnosis:    Pain in right shoulder [M25.511]  Other chronic pain [G89.29]      Referring Physician:  Josephine Perdomo APRN - NP  MD Orders:  PT Eval and Treat, 1-2x/wk for 4-6 weeks  Return MD Appt:  24  Date of Onset:  Onset Date: 24  Most recent surgery, also has past surgery on 10/27/23 which did not recovery properly and therefore caused him to have this surgery  Allergies:  Bee venom  Restrictions/Precautions:    Post Surgical Precautions: shoulder resurfacing      Medications Last Reviewed:  2025     SUBJECTIVE   History of Injury/Illness (Reason for Referral):  Pt had an injury at work that resulted in R shoulder surgery on 10/27/23. He went to [facility] and did not recover well. MD decided to do a shoulder resurfacing to help. That surgery was performed on 24. It is of note that between the shoulder surgeries he also had knee surgery for meniscal issues.   Patient Stated Goal(s):  \"get back to work, improve strength and ROM\"  Initial Pain Level:      0/10   Post Session Pain Level:     0/10  Past

## 2025-04-08 ENCOUNTER — APPOINTMENT (OUTPATIENT)
Dept: PHYSICAL THERAPY | Age: 60
End: 2025-04-08
Payer: COMMERCIAL

## 2025-04-15 ENCOUNTER — HOSPITAL ENCOUNTER (OUTPATIENT)
Dept: PHYSICAL THERAPY | Age: 60
Setting detail: RECURRING SERIES
Discharge: HOME | End: 2025-04-18
Payer: COMMERCIAL

## 2025-04-15 PROCEDURE — 97110 THERAPEUTIC EXERCISES: CPT

## 2025-04-15 ASSESSMENT — PAIN SCALES - GENERAL: PAINLEVEL_OUTOF10: 0

## 2025-04-15 NOTE — PROGRESS NOTES
\David Marie  : 1965  Primary: [facility] ([facility] [employer])  Secondary:  [facility]  [address]  [address]  [facility] [address]  Phone: [phone]  Fax: [phone] Plan Frequency: 1-2x/wk for 60 days      Plan of Care/Certification Expiration Date: 25          Plan of Care/Certification Expiration Date:  Plan of Care/Certification Expiration Date: 25      Frequency/Duration:   Plan Frequency: 1-2x/wk for 60 days        Time In/Out:   Time In: 0730  Time Out: 0815      PT Visit Info:    Total # of Visits Approved: 52  Total # of Visits to Date: 45      Visit Count:  45    OUTPATIENT PHYSICAL THERAPY:   Treatment Note 4/15/2025       Episode  (R shoulder resurfacing)               Treatment Diagnosis:    Stiffness of right shoulder, not elsewhere classified  Right shoulder pain, unspecified chronicity  Medical/Referring Diagnosis:    Pain in right shoulder [M25.511]  Other chronic pain [G89.29]      Referring Physician:  Josephine Perdomo APRN - NP  MD Orders:  PT Eval and Treat, 1-2x/wk for 4-6 weeks  Return MD Appt:  24   Date of Onset:  Onset Date: 24     Allergies:   Bee venom  Restrictions/Precautions:   Post Surgical Precautions: R shoulder resurfacing      Interventions Planned (Treatment may consist of any combination of the following):     See Assessment Note    Subjective Comments:   Pt reports pinching is still present. MD got him a CT scan but wont get results for a few days.   Initial Pain Level::     0/10  Post Session Pain Level:       1/10  Medications Last Reviewed:  4/15/2025  Updated Objective Findings:   Flex 170*  see progress note  Treatment   THERAPEUTIC EXERCISE: (40 minutes):    Exercises per grid below to improve mobility, strength, balance, and coordination.  Required minimal verbal and tactile cues to promote proper body alignment, promote proper body posture, and promote proper body mechanics.  Progressed resistance,

## 2025-04-22 ENCOUNTER — APPOINTMENT (OUTPATIENT)
Dept: PHYSICAL THERAPY | Age: 60
End: 2025-04-22
Payer: COMMERCIAL

## 2025-04-29 ENCOUNTER — APPOINTMENT (OUTPATIENT)
Dept: PHYSICAL THERAPY | Age: 60
End: 2025-04-29
Payer: COMMERCIAL

## 2025-05-06 ENCOUNTER — APPOINTMENT (OUTPATIENT)
Dept: PHYSICAL THERAPY | Age: 60
End: 2025-05-06
Payer: COMMERCIAL

## 2025-05-13 ENCOUNTER — HOSPITAL ENCOUNTER (OUTPATIENT)
Dept: PHYSICAL THERAPY | Age: 60
Setting detail: RECURRING SERIES
Discharge: HOME | End: 2025-05-16
Payer: COMMERCIAL

## 2025-05-13 PROCEDURE — 97110 THERAPEUTIC EXERCISES: CPT

## 2025-05-13 ASSESSMENT — PAIN SCALES - GENERAL: PAINLEVEL_OUTOF10: 0

## 2025-05-13 NOTE — PROGRESS NOTES
proper body alignment, promote proper body posture, and promote proper body mechanics.  Progressed resistance, range, repetitions, and complexity of movement as indicated.   Date:  3/11/25 Date:  3/18/25 Date:  3/25/25 Date:  4/1/25 Date:  4/15/25 Date:  5/13/25   Activity/Exercise Parameters Parameters Parameters Parameters Parameters Parameters   HEP         UBE 3/3 4.0 working ROM and strength 3/3 4.0 working ROM and strength 3/3 4.0 working ROM and strength 3/3 4.0 working ROM and strength 3/3 4.0 working ROM and strength 3/3 4.0 working ROM and strength   Shu         Serratus punch  20x supine black sports band 2x10 15# on bench  2x10 10# R  2x10 7# B   Isometrics      20x ER/IR supine  20x wall flexion, abduction, extension   Stabilizations         Wand      5# wand flexion 20x   PROM stretching         Wall clock         Wall slides 10x w/ lift off and eccentric lower  Into flexion 20x   2x10 flexion w/ lift off  15x abduction   Rows 10# cables 2x10 3# B prone 20x 17# cables 20x Single arm 13# cables 20x  2x10 2#    Ms 2x10 10# cables 3# B prone 20x 10# cables 20x   2x10 2#    T  3# B prone 20x       Y  2x10 forward lean        Lat pull down 23# seated lat pull down cables elbows bent 2x10  13# 2x10 seated arms straight cables Black sports band in supine 20x  2x10 20# standing      Wall ball         AAROM         Shoulder flexion 10x against gravity  10x against gravity  Eccentric 10x Supine against resistance 15x    Shoulder abduction  Sidelying 3# regular and horizontal  Eccentric 10x Supine on bench horizontal w/ PT CGA and helping with position    Shrug   2x10 15#   2x10 20#     Shoulder ER 2x10 3# cables 20x sidelying lime band 2x10 3# cables Row w/ ER 3# 20x     Shoulder IR 2x10 7# cables 20x sidelying lime band 2x10 7# cables      Thoracic extension L stretch 3x  In chair 5x   10x in chair      Upper trap stretch 10x at table        Diagonal/PNF   Tried 3# cable but unable w/o help      Biceps

## 2025-05-20 ENCOUNTER — HOSPITAL ENCOUNTER (OUTPATIENT)
Dept: PHYSICAL THERAPY | Age: 60
Setting detail: RECURRING SERIES
Discharge: HOME | End: 2025-05-23
Payer: COMMERCIAL

## 2025-05-20 PROCEDURE — 97110 THERAPEUTIC EXERCISES: CPT

## 2025-05-20 PROCEDURE — 97140 MANUAL THERAPY 1/> REGIONS: CPT

## 2025-05-20 ASSESSMENT — PAIN SCALES - GENERAL: PAINLEVEL_OUTOF10: 0

## 2025-05-20 NOTE — PROGRESS NOTES
promote proper body posture, and promote proper body mechanics.  Progressed resistance, range, repetitions, and complexity of movement as indicated.   Date:  3/18/25 Date:  3/25/25 Date:  4/1/25 Date:  4/15/25 Date:  5/13/25 Date:  5/20/25   Activity/Exercise Parameters Parameters Parameters Parameters Parameters Parameters   HEP         UBE 3/3 4.0 working ROM and strength 3/3 4.0 working ROM and strength 3/3 4.0 working ROM and strength 3/3 4.0 working ROM and strength 3/3 4.0 working ROM and strength 3/3 4.0 working ROM and strength   Shu         Serratus punch 20x supine black sports band 2x10 15# on bench  2x10 10# R  2x10 7# B    Finger ladder      20x abduction   Isometrics     20x ER/IR supine  20x wall flexion, abduction, extension 20x abduction walk outs   Stabilizations      20x abduction sidelying regular and horizontal   Wand     5# wand flexion 20x    PROM stretching         Wall clock         Wall slides  Into flexion 20x   2x10 flexion w/ lift off  15x abduction 20x flexion w/ lift off   Rows 3# B prone 20x 17# cables 20x Single arm 13# cables 20x  2x10 2#  2x10 10# band   Ms 3# B prone 20x 10# cables 20x   2x10 2#  2x10 10# band   T 3# B prone 20x        Y          Lat pull down Black sports band in supine 20x  2x10 20# standing       Wall ball         AAROM         Shoulder flexion 10x against gravity  Eccentric 10x Supine against resistance 15x     Shoulder abduction Sidelying 3# regular and horizontal  Eccentric 10x Supine on bench horizontal w/ PT CGA and helping with position  Sidelying 20x w/ assist   Shrug  2x10 15#   2x10 20#      Shoulder ER 20x sidelying lime band 2x10 3# cables Row w/ ER 3# 20x   2x10 5#    Shoulder IR 20x sidelying lime band 2x10 7# cables    2x10 10#    Thoracic extension  10x in chair       Upper trap stretch         Diagonal/PNF  Tried 3# cable but unable w/o help       Biceps  2x10 15#        Triceps  Knees bent dips 2x10       Chest press  2x10 15# bench  2x10 5#

## 2025-05-27 ENCOUNTER — APPOINTMENT (OUTPATIENT)
Dept: PHYSICAL THERAPY | Age: 60
End: 2025-05-27
Payer: COMMERCIAL

## 2025-06-18 ENCOUNTER — HOSPITAL ENCOUNTER (OUTPATIENT)
Dept: PHYSICAL THERAPY | Age: 60
Setting detail: RECURRING SERIES
Discharge: HOME | End: 2025-06-21
Payer: COMMERCIAL

## 2025-06-18 DIAGNOSIS — M25.611 STIFFNESS OF RIGHT SHOULDER, NOT ELSEWHERE CLASSIFIED: ICD-10-CM

## 2025-06-18 DIAGNOSIS — M25.511 PAIN IN SHOULDER REGION, RIGHT: Primary | ICD-10-CM

## 2025-06-18 PROCEDURE — 97161 PT EVAL LOW COMPLEX 20 MIN: CPT

## 2025-06-18 PROCEDURE — 97110 THERAPEUTIC EXERCISES: CPT

## 2025-06-18 ASSESSMENT — PAIN SCALES - GENERAL: PAINLEVEL_OUTOF10: 3

## 2025-06-18 NOTE — PROGRESS NOTES
David Marie  : 1965  Primary: [facility] ([employer])  Secondary:  [place] [address]  [address] [place] [address]  Phone: [phone]  Fax: [phone] Plan Frequency: 1-2x/wk for 90 days    Plan of Care/Certification Expiration Date: 25        Plan of Care/Certification Expiration Date:  Plan of Care/Certification Expiration Date: 25    Frequency/Duration: Plan Frequency: 1-2x/wk for 90 days      Time In/Out:   Time In: 1115  Time Out: 1200      PT Visit Info:    Total # of Visits Approved: 10  Total # of Visits to Date: 1      Visit Count:  1    OUTPATIENT PHYSICAL THERAPY:   Treatment Note 2025       Episode  (RTC (supraspinatus) repair)               Treatment Diagnosis:    Pain in shoulder region, right  Stiffness of right shoulder, not elsewhere classified  Medical/Referring Diagnosis:    Other specified postprocedural states [Z98.890]    Referring Physician:  Josephine Perdomo APRN - NP MD Orders:  PT Eval and Treat, see protocol   Return MD Appt:  1 month    Date of Onset:  Onset Date: 25 (surgery date)     Allergies:   Bee venom  Restrictions/Precautions:   Post Surgical Precautions: see protocol      Interventions Planned (Treatment may consist of any combination of the following):     See Assessment Note    Subjective Comments:   Pt reports soreness in his shoulder at rest due to post op status.   Initial Pain Level:     3/10  Post Session Pain Level:      2/10  Medications Last Reviewed: 2025  Updated Objective Findings:  See Evaluation Note from today  Treatment   THERAPEUTIC EXERCISE: (10 minutes):    Exercises per grid below to improve mobility, strength, balance, and coordination.  Required minimal verbal and tactile cues to promote proper body alignment, promote proper body posture, and promote proper body mechanics.  Progressed resistance, range, repetitions, and complexity of movement

## 2025-06-18 NOTE — THERAPY EVALUATION
full thickness tear. He again had surgery on 6/9/25. He was told he has a 40% chance of recovery with this surgery and may need a reverse total shoulder due to how poor his tissue was upon inspection during operation.   Patient Stated Goal(s):  \"less pain, full motion and strength of shoulder\"  Initial Pain Level:      3/10   Post Session Pain Level:     2/10  Past Medical History/Comorbidities:   Mr. Marie  has a past medical history of Cancer (HCC) and Chronic pain.  Mr. Marie  has a past surgical history that includes Colonoscopy; gi; Knee arthroscopy (Right, 07/2020); Urological Surgery; hernia repair; Prostatectomy; and orthopedic surgery.  Social History/Living Environment:   Patient lives with their family  Type of Home: House: Two Story  Level of Assistance: Rehab: Independent    Prior Level of Function/Work/Activity:   Prior Level of Function: Independent   Current Level of Function: Independent   Employment Status: Medical Leave  Occupation:  at airport     Learning:   Does the patient/guardian have any barriers to learning?: No barriers  Will there be a co-learner?: No  What is the preferred language of the patient/guardian?: English  Is an  required?: No  How does the patient/guardian prefer to learn new concepts?: Listening; Reading; Demonstration; Pictures/Videos    Fall Risk Scale:   Calix Total Score: 15    Dominant Side:  right handed     OBJECTIVE      Date:  6/18/25 Date:   Date:     Shoulder Flex/Scapt R: 90*  L: 180*, 5/5     Shoulder ABD R: 40*  L: 180*, 5/5     Shoulder ER R: 30*  L: 85*, 5/5     Shoulder IR R: 40*  L: 85*, 5/5       Sensation:  Intact w/ light touch to BUEs    Outcome Measure:   Tool Used: Disabilities of the Arm, Shoulder and Hand (DASH) Questionnaire - Quick Version:   Score:  Initial: 47/55  Most Recent: X/55 (Date: -- )   Interpretation of Score: The DASH is designed to measure the activities of daily living in person's with upper extremity

## 2025-06-25 ENCOUNTER — HOSPITAL ENCOUNTER (OUTPATIENT)
Dept: PHYSICAL THERAPY | Age: 60
Setting detail: RECURRING SERIES
Discharge: HOME | End: 2025-06-28
Payer: COMMERCIAL

## 2025-06-25 PROCEDURE — 97140 MANUAL THERAPY 1/> REGIONS: CPT

## 2025-06-25 PROCEDURE — 97110 THERAPEUTIC EXERCISES: CPT

## 2025-06-25 ASSESSMENT — PAIN SCALES - GENERAL: PAINLEVEL_OUTOF10: 3

## 2025-06-25 NOTE — PROGRESS NOTES
David Marie  : 1965  Primary: [facility] ([employer])  Secondary:  [place] [address]  [address] [place] [address]  Phone: [phone]  Fax: [phone] Plan Frequency: 1-2x/wk for 90 days    Plan of Care/Certification Expiration Date: 25        Plan of Care/Certification Expiration Date:  Plan of Care/Certification Expiration Date: 25    Frequency/Duration: Plan Frequency: 1-2x/wk for 90 days      Time In/Out:   Time In: 730  Time Out: 809      PT Visit Info:    Total # of Visits Approved: 10  Total # of Visits to Date: 2      Visit Count:  2    OUTPATIENT PHYSICAL THERAPY:   Treatment Note 2025       Episode  (RTC (supraspinatus) repair)               Treatment Diagnosis:    Pain in shoulder region, right  Stiffness of right shoulder, not elsewhere classified  Medical/Referring Diagnosis:    Other specified postprocedural states [Z98.890]    Referring Physician:  Josephine Perdomo APRN - NP MD Orders:  PT Eval and Treat, see protocol   Return MD Appt:  1 month    Date of Onset:  Onset Date: 25 (surgery date)     Allergies:   Bee venom  Restrictions/Precautions:   Post Surgical Precautions: see protocol      Interventions Planned (Treatment may consist of any combination of the following):     See Assessment Note    Subjective Comments:   Pt reports he's been feeling ok, just pretty sore and stiff.   Initial Pain Level:     3/10  Post Session Pain Level:      1/10  Medications Last Reviewed: 2025  Updated Objective Findings:  None Today  Treatment   THERAPEUTIC EXERCISE: (15 minutes):    Exercises per grid below to improve mobility, strength, balance, and coordination.  Required minimal verbal and tactile cues to promote proper body alignment, promote proper body posture, and promote proper body mechanics.  Progressed resistance, range, repetitions, and complexity of movement as indicated.  MANUAL

## 2025-06-30 ENCOUNTER — HOSPITAL ENCOUNTER (OUTPATIENT)
Dept: PHYSICAL THERAPY | Age: 60
Setting detail: RECURRING SERIES
Discharge: HOME | End: 2025-07-03
Payer: COMMERCIAL

## 2025-06-30 PROCEDURE — 97140 MANUAL THERAPY 1/> REGIONS: CPT

## 2025-06-30 PROCEDURE — 97110 THERAPEUTIC EXERCISES: CPT

## 2025-06-30 ASSESSMENT — PAIN SCALES - GENERAL: PAINLEVEL_OUTOF10: 3

## 2025-06-30 NOTE — PROGRESS NOTES
David Marie  : 1965  Primary: [facility] ([employer])  Secondary:  [place] [address]  [address] [place] [address]  Phone: [phone]  Fax: [phone] Plan Frequency: 1-2x/wk for 90 days    Plan of Care/Certification Expiration Date: 25        Plan of Care/Certification Expiration Date:  Plan of Care/Certification Expiration Date: 25    Frequency/Duration: Plan Frequency: 1-2x/wk for 90 days      Time In/Out:   Time In: 730  Time Out: 813      PT Visit Info:    Total # of Visits Approved: 10  Total # of Visits to Date: 3      Visit Count:  3    OUTPATIENT PHYSICAL THERAPY:   Treatment Note 2025       Episode  (RTC (supraspinatus) repair)               Treatment Diagnosis:    Pain in shoulder region, right  Stiffness of right shoulder, not elsewhere classified  Medical/Referring Diagnosis:    Other specified postprocedural states [Z98.890]    Referring Physician:  Josephine Perdomo APRN - NP MD Orders:  PT Eval and Treat, see protocol   Return MD Appt:  1 month    Date of Onset:  Onset Date: 25 (surgery date)     Allergies:   Bee venom  Restrictions/Precautions:   Post Surgical Precautions: see protocol      Interventions Planned (Treatment may consist of any combination of the following):     See Assessment Note    Subjective Comments:   Pt reports he had moderate R shoulder soreness over the weekend, but overall hasn't felt too bad.    Initial Pain Level:     3/10  Post Session Pain Level:      2/10  Medications Last Reviewed: 2025  Updated Objective Findings:  None Today  Treatment   THERAPEUTIC EXERCISE: (13 minutes):    Exercises per grid below to improve mobility, strength, balance, and coordination.  Required minimal verbal and tactile cues to promote proper body alignment, promote proper body posture, and promote proper body mechanics.  Progressed resistance, range, repetitions, and complexity

## 2025-07-07 ENCOUNTER — HOSPITAL ENCOUNTER (OUTPATIENT)
Dept: PHYSICAL THERAPY | Age: 60
Setting detail: RECURRING SERIES
Discharge: HOME | End: 2025-07-10
Payer: COMMERCIAL

## 2025-07-07 PROCEDURE — 97140 MANUAL THERAPY 1/> REGIONS: CPT

## 2025-07-07 PROCEDURE — 97110 THERAPEUTIC EXERCISES: CPT

## 2025-07-07 ASSESSMENT — PAIN SCALES - GENERAL: PAINLEVEL_OUTOF10: 5

## 2025-07-07 NOTE — PROGRESS NOTES
David Marie  : 1965  Primary: [facility] ([employer])  Secondary:  [place] [address]  [address] [place] [address]  Phone: [phone]  Fax: [phone] Plan Frequency: 1-2x/wk for 90 days    Plan of Care/Certification Expiration Date: 25        Plan of Care/Certification Expiration Date:  Plan of Care/Certification Expiration Date: 25    Frequency/Duration: Plan Frequency: 1-2x/wk for 90 days      Time In/Out:   Time In: 0815  Time Out: 0900      PT Visit Info:    Total # of Visits Approved: 10  Total # of Visits to Date: 4      Visit Count:  4    OUTPATIENT PHYSICAL THERAPY:   Treatment Note 2025       Episode  (RTC (supraspinatus) repair)               Treatment Diagnosis:    Pain in shoulder region, right  Stiffness of right shoulder, not elsewhere classified  Medical/Referring Diagnosis:    Other specified postprocedural states [Z98.890]    Referring Physician:  Josephine Perdomo APRN - NP  MD Orders:  PT Eval and Treat, see protocol   Return MD Appt:  1 month    Date of Onset:  Onset Date: 25 (surgery date)     Allergies:   Bee venom  Restrictions/Precautions:   Post Surgical Precautions: see protocol      Interventions Planned (Treatment may consist of any combination of the following):     See Assessment Note    Subjective Comments:   Pt reports he has sharp pain today and had a swollen knot in his shoulder over the weekend. Didn't do anything but drive and only used his noninvolved shoulder. It got a little better with anti inflammatory meds but still painful.   Initial Pain Level:     5/10  Post Session Pain Level:      1/10  Medications Last Reviewed: 2025  Updated Objective Findings:  None Today  Treatment   THERAPEUTIC EXERCISE: (10 minutes):    Exercises per grid below to improve mobility, strength, balance, and coordination.  Required minimal verbal and tactile cues to promote proper body

## 2025-07-09 ENCOUNTER — HOSPITAL ENCOUNTER (OUTPATIENT)
Dept: PHYSICAL THERAPY | Age: 60
Setting detail: RECURRING SERIES
Discharge: HOME | End: 2025-07-12
Payer: COMMERCIAL

## 2025-07-09 PROCEDURE — 97110 THERAPEUTIC EXERCISES: CPT

## 2025-07-09 PROCEDURE — 97140 MANUAL THERAPY 1/> REGIONS: CPT

## 2025-07-09 ASSESSMENT — PAIN SCALES - GENERAL: PAINLEVEL_OUTOF10: 2

## 2025-07-09 NOTE — PROGRESS NOTES
posture, and promote proper body mechanics.  Progressed resistance, range, repetitions, and complexity of movement as indicated.  MANUAL THERAPY: (25 minutes):   Joint mobilization and Soft tissue mobilization was utilized and necessary because of the patient's restricted joint motion, painful spasm, loss of articular motion, and restricted motion of soft tissue.   6/30/25:  PROM into ER, flexion, gentle abduction.  STMand MFR to cervical paraspinals, R UT, and R distal RTC musculature.  Cervical joint mobilizations bilaterally.   Date:  7/7/25 Date:  7/9/25   Parameters    STM to R pec and upper trap STM to R pec and upper trap in supine   PROM in ER, flexion and abduction to tolerance PROM in ER, flexion, and abduction to tolerance     MODALITIES:            Date:  6/18/25 Date:  6/25/25 Date:  6/30/25 Date:  7/7/25 Date:  7/9/25   Activity/Exercise Parameters Parameters Parameters Parameters Parameters   Pt education HEP and precautions       Scapular retraction 15x seated X20 seated x20 x20 X20 elbows bent  X20 elbows straight   Shrugs 15x seated X20 seated x20 X20   Circles x20 X20    Pendulums Side to side, forward back, and circles X90 seconds each (x4 directions) X90 seconds each (x4 directions)  X90 seconds each (x4 directions)   Nustep to improve aerobic tolerance and muscular oxidative capacity.   X10 mins   Lv 3.0  Low/high   5 mins L5    Thoracic extension    10x in chair                        Treatment/Session Summary:    Treatment Assessment:   Pt educated to use sling and pillow consistently. Also educated to avoid andie his muscles per protocol as he causes shoulder pain when he does so. Pt was challenged w/ therex. His ROM is improving each session.   Communication/Consultation:  None today  Equipment provided today:  None  Recommendations/Intent for next treatment session: Next visit will focus on progression of functional tasks as tolerated.    >Total Treatment Billable Duration:  25

## 2025-07-14 ENCOUNTER — HOSPITAL ENCOUNTER (OUTPATIENT)
Dept: PHYSICAL THERAPY | Age: 60
Setting detail: RECURRING SERIES
Discharge: HOME | End: 2025-07-17
Payer: COMMERCIAL

## 2025-07-14 PROCEDURE — 97140 MANUAL THERAPY 1/> REGIONS: CPT

## 2025-07-14 PROCEDURE — 97110 THERAPEUTIC EXERCISES: CPT

## 2025-07-14 ASSESSMENT — PAIN SCALES - GENERAL: PAINLEVEL_OUTOF10: 2

## 2025-07-14 NOTE — PROGRESS NOTES
David Marie  : 1965  Primary: [facility] ([employer])  Secondary:  [place] [address]  [address] [place] [address]  Phone: [phone]  Fax: [phone] Plan Frequency: 1-2x/wk for 90 days    Plan of Care/Certification Expiration Date: 25        Plan of Care/Certification Expiration Date:  Plan of Care/Certification Expiration Date: 25    Frequency/Duration: Plan Frequency: 1-2x/wk for 90 days      Time In/Out:   Time In: 815  Time Out: 903      PT Visit Info:    Total # of Visits Approved: 10  Total # of Visits to Date: 5      Visit Count:  5    OUTPATIENT PHYSICAL THERAPY:   Treatment Note 2025       Episode  (RTC (supraspinatus) repair)               Treatment Diagnosis:    Pain in shoulder region, right  Stiffness of right shoulder, not elsewhere classified  Medical/Referring Diagnosis:    Other specified postprocedural states [Z98.890]    Referring Physician:  Josephine Perdomo APRN - NP MD Orders:  PT Eval and Treat, see protocol   Return MD Appt:  1 month    Date of Onset:  Onset Date: 25 (surgery date)     Allergies:   Bee venom  Restrictions/Precautions:   Post Surgical Precautions: see protocol      Interventions Planned (Treatment may consist of any combination of the following):     See Assessment Note    Subjective Comments:   Pt reports his pain is still high at times. Thinks something is amiss and wants to talk with his MD about it so he sent him a message saying so on MyChart.   Initial Pain Level:     2/10  Post Session Pain Level:      2/10  Medications Last Reviewed: 2025  Updated Objective Findings:  None Today  Treatment   THERAPEUTIC EXERCISE: (15 minutes):    Exercises per grid below to improve mobility, strength, balance, and coordination.  Required minimal verbal and tactile cues to promote proper body alignment, promote proper body posture, and promote proper body mechanics.

## 2025-07-16 ENCOUNTER — HOSPITAL ENCOUNTER (OUTPATIENT)
Dept: PHYSICAL THERAPY | Age: 60
Setting detail: RECURRING SERIES
Discharge: HOME | End: 2025-07-19
Payer: COMMERCIAL

## 2025-07-16 PROCEDURE — 97110 THERAPEUTIC EXERCISES: CPT

## 2025-07-16 PROCEDURE — 97140 MANUAL THERAPY 1/> REGIONS: CPT

## 2025-07-16 ASSESSMENT — PAIN SCALES - GENERAL: PAINLEVEL_OUTOF10: 2

## 2025-07-16 NOTE — PROGRESS NOTES
David Marie  : 1965  Primary: [facility] ([employer])  Secondary:  [place] [address]  [address] [place] [address]  Phone: [phone]  Fax: [phone] Plan Frequency: 1-2x/wk for 90 days    Plan of Care/Certification Expiration Date: 25        Plan of Care/Certification Expiration Date:  Plan of Care/Certification Expiration Date: 25    Frequency/Duration: Plan Frequency: 1-2x/wk for 90 days      Time In/Out:   Time In: 30  Time Out: 0815      PT Visit Info:    Total # of Visits Approved: 10  Total # of Visits to Date: 6      Visit Count:  6    OUTPATIENT PHYSICAL THERAPY:   Treatment Note 2025       Episode  (RTC (supraspinatus) repair)               Treatment Diagnosis:    Pain in shoulder region, right  Stiffness of right shoulder, not elsewhere classified  Medical/Referring Diagnosis:    Other specified postprocedural states [Z98.890]    Referring Physician:  Josephine Perdomo APRN - NP MD Orders:  PT Eval and Treat, see protocol   Return MD Appt:  1 month    Date of Onset:  Onset Date: 25 (surgery date)     Allergies:   Bee venom  Restrictions/Precautions:   Post Surgical Precautions: see protocol      Interventions Planned (Treatment may consist of any combination of the following):     See Assessment Note    Subjective Comments:   Pt reports his pain was high in the night. Now a 2/10. Goes to MD tomorrow. Called an  yesterday.  Initial Pain Level:     2/10  Post Session Pain Level:      2/10  Medications Last Reviewed: 2025  Updated Objective Findings:  None Today  Treatment   THERAPEUTIC EXERCISE: (15 minutes):    Exercises per grid below to improve mobility, strength, balance, and coordination.  Required minimal verbal and tactile cues to promote proper body alignment, promote proper body posture, and promote proper body mechanics.  Progressed resistance, range, repetitions, and

## 2025-07-21 ENCOUNTER — HOSPITAL ENCOUNTER (OUTPATIENT)
Dept: PHYSICAL THERAPY | Age: 60
Setting detail: RECURRING SERIES
Discharge: HOME | End: 2025-07-24
Payer: COMMERCIAL

## 2025-07-21 PROCEDURE — 97140 MANUAL THERAPY 1/> REGIONS: CPT

## 2025-07-21 PROCEDURE — 97110 THERAPEUTIC EXERCISES: CPT

## 2025-07-21 ASSESSMENT — PAIN SCALES - GENERAL: PAINLEVEL_OUTOF10: 2

## 2025-07-21 NOTE — PROGRESS NOTES
David Marie  : 1965  Primary: [facility] ([facility])  Secondary:  [place] [address]  [address] [place] [address]  Phone: [phone]  Fax: [phone] Plan Frequency: 1-2x/wk for 90 days    Plan of Care/Certification Expiration Date: 25        Plan of Care/Certification Expiration Date:  Plan of Care/Certification Expiration Date: 25    Frequency/Duration: Plan Frequency: 1-2x/wk for 90 days      Time In/Out:   Time In: 815  Time Out: 904      PT Visit Info:    Total # of Visits Approved: 10  Total # of Visits to Date: 7      Visit Count:  7    OUTPATIENT PHYSICAL THERAPY:   Treatment Note 2025       Episode  (RTC (supraspinatus) repair)               Treatment Diagnosis:    Pain in shoulder region, right  Stiffness of right shoulder, not elsewhere classified  Medical/Referring Diagnosis:    Other specified postprocedural states [Z98.890]    Referring Physician:  Josephine Perdomo APRN - NP MD Orders:  PT Eval and Treat, see protocol   Return MD Appt:  1 month    Date of Onset:  Onset Date: 25 (surgery date)     Allergies:   Bee venom  Restrictions/Precautions:   Post Surgical Precautions: see protocol      Interventions Planned (Treatment may consist of any combination of the following):     See Assessment Note    Subjective Comments:   Pt met with MD and was told he could go without his sling at times. He also was told to move to phase 2 of rehab and if not improving in next 2 weeks they would do a repeat MRI.   Initial Pain Level:     2/10  Post Session Pain Level:      2/10  Medications Last Reviewed: 2025   Updated Objective Findings:  None Today 37 cm L, 40 cm R, bruised and swollen upon arrival    Treatment   THERAPEUTIC EXERCISE: (25 minutes):    Exercises per grid below to improve mobility, strength, balance, and coordination.  Required minimal verbal and tactile cues to promote proper body

## 2025-07-23 ENCOUNTER — HOSPITAL ENCOUNTER (OUTPATIENT)
Dept: PHYSICAL THERAPY | Age: 60
Setting detail: RECURRING SERIES
Discharge: HOME | End: 2025-07-26
Payer: COMMERCIAL

## 2025-07-23 PROCEDURE — 97140 MANUAL THERAPY 1/> REGIONS: CPT

## 2025-07-23 PROCEDURE — 97110 THERAPEUTIC EXERCISES: CPT

## 2025-07-23 ASSESSMENT — PAIN SCALES - GENERAL: PAINLEVEL_OUTOF10: 1

## 2025-07-23 NOTE — PROGRESS NOTES
David Marie  : 1965  Primary: [facility] ([facility] [employer])  Secondary: [place] [address]  [address]  [facility] [address]  Phone: [phone]  Fax: [phone] Plan Frequency: 1-2x/wk for 90 days    Plan of Care/Certification Expiration Date: 25        Plan of Care/Certification Expiration Date:  Plan of Care/Certification Expiration Date: 25    Frequency/Duration: Plan Frequency: 1-2x/wk for 90 days      Time In/Out:   Time In: 730  Time Out: 818      PT Visit Info:    Total # of Visits Approved: 10  Total # of Visits to Date: 9         OUTPATIENT PHYSICAL THERAPY:   Treatment Note 2025       Episode  (RTC (supraspinatus) repair)               Treatment Diagnosis:    Pain in shoulder region, right  Stiffness of right shoulder, not elsewhere classified  Medical/Referring Diagnosis:    Other specified postprocedural states [Z98.890]    Referring Physician:  Josephine Perdomo APRN - NP MD Orders:  PT Eval and Treat, see protocol   Return MD Appt:  1 month    Date of Onset:  Onset Date: 25 (surgery date)     Allergies:   Bee venom  Restrictions/Precautions:   Post Surgical Precautions: see protocol      Interventions Planned (Treatment may consist of any combination of the following):     See Assessment Note    Subjective Comments:   Pt reports pain is stable due to icing and pain meds. Also swelling is better.   Initial Pain Level:     1/10  Post Session Pain Level:      1/10  Medications Last Reviewed: 2025   Updated Objective Findings:  None Today 37 cm L, 40 cm R, bruised and swollen upon arrival    Treatment   THERAPEUTIC EXERCISE: (30 minutes):    Exercises per grid below to improve mobility, strength, balance, and coordination.  Required minimal verbal and tactile cues to promote proper body alignment, promote proper body posture, and promote proper body mechanics.  Progressed resistance, range,

## 2025-07-28 ENCOUNTER — HOSPITAL ENCOUNTER (OUTPATIENT)
Dept: PHYSICAL THERAPY | Age: 60
Setting detail: RECURRING SERIES
Discharge: HOME | End: 2025-07-31
Payer: COMMERCIAL

## 2025-07-28 PROCEDURE — 97140 MANUAL THERAPY 1/> REGIONS: CPT

## 2025-07-28 PROCEDURE — 97110 THERAPEUTIC EXERCISES: CPT

## 2025-07-28 ASSESSMENT — PAIN SCALES - GENERAL: PAINLEVEL_OUTOF10: 2

## 2025-07-28 NOTE — THERAPY EVALUATION
David Marie  : 1965  Primary: [facility] ([facility] [employer])  Secondary: [place] [address]  [address]  [facility] [address]  Phone: [phone]  Fax: [phone] Plan Frequency: 1-2x/wk for 90 days  Plan of Care/Certification Expiration Date: 25        Plan of Care/Certification Expiration Date:  Plan of Care/Certification Expiration Date: 25    Frequency/Duration: Plan Frequency: 1-2x/wk for 90 days      Time In/Out:   Time In: 730  Time Out: 817      PT Visit Info:    Total # of Visits Approved: 10  Total # of Visits to Date: 10                     OUTPATIENT PHYSICAL THERAPY:             Progress Report 2025               Episode (RTC (supraspinatus) repair)         Treatment Diagnosis:     Pain in shoulder region, right  Stiffness of right shoulder, not elsewhere classified  Medical/Referring Diagnosis:    Other specified postprocedural states [Z98.890]    Referring Physician:  Josephine Perdomo APRN - NP MD Orders:  PT Eval and Treat, per protocol   Return MD Appt:  1 month  Date of Onset:  Onset Date: 25 (surgery date)     Allergies:  Bee venom  Restrictions/Precautions:    Post Surgical Precautions: follow protocol      Medications Last Reviewed: 2025     SUBJECTIVE   History of Injury/Illness (Reason for Referral):  Pt had an injury at work that resulted in R shoulder surgery on 10/27/23. He went to [facility] and did not recover well. MD decided to do a shoulder resurfacing to help. That surgery was performed on 24. It is of note that between the shoulder surgeries he also had knee surgery for meniscal issues. He has since completed PT for meniscus and his shoulder but his shoulder never fully recovered. PT and patient both mentioned so to the physician and patient was assured he was fine and to continue with PT. Later imaging was ordered and his supraspinatus was found to have a full

## 2025-07-28 NOTE — PROGRESS NOTES
David Marie  : 1965  Primary: [facility] ([facility])  Secondary: [place] [address]  [address]  [facility] [address]  Phone: [phone]  Fax: [phone] Plan Frequency: 1-2x/wk for 90 days    Plan of Care/Certification Expiration Date: 25        Plan of Care/Certification Expiration Date:  Plan of Care/Certification Expiration Date: 25    Frequency/Duration: Plan Frequency: 1-2x/wk for 90 days      Time In/Out:   Time In: 730  Time Out: 817      PT Visit Info:    Total # of Visits Approved: 10  Total # of Visits to Date: 10         OUTPATIENT PHYSICAL THERAPY:   Treatment Note 2025       Episode  (RTC (supraspinatus) repair)               Treatment Diagnosis:    Pain in shoulder region, right  Stiffness of right shoulder, not elsewhere classified  Medical/Referring Diagnosis:    Other specified postprocedural states [Z98.890]    Referring Physician:  Josephine Perdomo APRN - NP MD Orders:  PT Eval and Treat, see protocol   Return MD Appt:  1 month    Date of Onset:  Onset Date: 25 (surgery date)     Allergies:   Bee venom  Restrictions/Precautions:   Post Surgical Precautions: see protocol      Interventions Planned (Treatment may consist of any combination of the following):     See Assessment Note    Subjective Comments:   Pt reports pain has not changed. Still sends zingers at time that are going down his arm. Pain feels like it did prior to surgery. Has reached out to his MD as well.   Initial Pain Level:     2/10  Post Session Pain Level:      2/10  Medications Last Reviewed: 2025   Updated Objective Findings:  None Today 37 cm L, 40 cm R, bruised and swollen upon arrival    Treatment   THERAPEUTIC EXERCISE: (15 minutes):    Exercises per grid below to improve mobility, strength, balance, and coordination.  Required minimal verbal and tactile cues to promote proper body alignment, promote